# Patient Record
Sex: FEMALE | Race: BLACK OR AFRICAN AMERICAN | NOT HISPANIC OR LATINO | Employment: FULL TIME | URBAN - METROPOLITAN AREA
[De-identification: names, ages, dates, MRNs, and addresses within clinical notes are randomized per-mention and may not be internally consistent; named-entity substitution may affect disease eponyms.]

---

## 2023-04-16 DIAGNOSIS — I10 ESSENTIAL (PRIMARY) HYPERTENSION: ICD-10-CM

## 2023-04-17 RX ORDER — LISINOPRIL AND HYDROCHLOROTHIAZIDE 10; 12.5 MG/1; MG/1
TABLET ORAL
Qty: 90 TABLET | Refills: 0 | Status: SHIPPED | OUTPATIENT
Start: 2023-04-17 | End: 2024-01-25 | Stop reason: SDUPTHER

## 2023-04-24 ENCOUNTER — TELEPHONE (OUTPATIENT)
Dept: PRIMARY CARE | Facility: CLINIC | Age: 43
End: 2023-04-24

## 2023-09-06 PROBLEM — M54.9 BACK PAIN: Status: ACTIVE | Noted: 2023-09-06

## 2023-09-06 PROBLEM — K21.9 GERD (GASTROESOPHAGEAL REFLUX DISEASE): Status: ACTIVE | Noted: 2023-09-06

## 2023-09-06 PROBLEM — L73.9 FOLLICULITIS: Status: ACTIVE | Noted: 2023-09-06

## 2023-09-06 PROBLEM — R10.9 RIGHT SIDED ABDOMINAL PAIN: Status: ACTIVE | Noted: 2023-09-06

## 2023-09-06 PROBLEM — N89.8 VAGINAL LESION: Status: ACTIVE | Noted: 2023-09-06

## 2023-09-06 PROBLEM — M72.2 PLANTAR FASCIITIS, LEFT: Status: ACTIVE | Noted: 2023-09-06

## 2023-09-06 PROBLEM — R35.0 FREQUENCY OF URINATION: Status: ACTIVE | Noted: 2023-09-06

## 2023-09-06 PROBLEM — R05.9 COUGH: Status: ACTIVE | Noted: 2023-09-06

## 2023-09-06 PROBLEM — R31.9 HEMATURIA: Status: ACTIVE | Noted: 2023-09-06

## 2023-09-06 PROBLEM — S23.9XXA SPRAIN, THORACIC: Status: ACTIVE | Noted: 2023-09-06

## 2023-09-06 PROBLEM — R05.3 CHRONIC COUGH: Status: ACTIVE | Noted: 2023-09-06

## 2023-09-06 PROBLEM — E66.01 SEVERE OBESITY (BMI 35.0-39.9) WITH COMORBIDITY (MULTI): Status: ACTIVE | Noted: 2023-09-06

## 2023-09-06 PROBLEM — M25.561 RIGHT KNEE PAIN: Status: ACTIVE | Noted: 2023-09-06

## 2023-09-06 PROBLEM — N92.0 MENORRHAGIA: Status: ACTIVE | Noted: 2023-09-06

## 2023-09-06 PROBLEM — R07.81 PAINFUL RIB: Status: ACTIVE | Noted: 2023-09-06

## 2023-09-06 PROBLEM — L82.0 INFLAMED SEBORRHEIC KERATOSIS: Status: ACTIVE | Noted: 2022-10-20

## 2023-09-06 PROBLEM — R10.11 ABDOMINAL PAIN, RUQ (RIGHT UPPER QUADRANT): Status: ACTIVE | Noted: 2023-09-06

## 2023-09-06 PROBLEM — B35.1 TINEA UNGUIUM: Status: ACTIVE | Noted: 2022-10-20

## 2023-09-06 PROBLEM — N63.0 MASS OF BREAST: Status: ACTIVE | Noted: 2023-09-06

## 2023-09-06 PROBLEM — E55.9 VITAMIN D DEFICIENCY: Status: ACTIVE | Noted: 2023-09-06

## 2023-09-06 PROBLEM — R82.90 ABNORMAL URINE FINDING: Status: ACTIVE | Noted: 2023-09-06

## 2023-09-06 PROBLEM — R10.9 RIGHT FLANK PAIN: Status: ACTIVE | Noted: 2023-09-06

## 2023-09-06 PROBLEM — I10 HYPERTENSION: Status: ACTIVE | Noted: 2023-09-06

## 2023-09-06 PROBLEM — D22.70 MELANOCYTIC NEVI OF LOWER EXTREMITY OR HIP: Status: ACTIVE | Noted: 2022-10-20

## 2023-09-06 PROBLEM — D21.9 FIBROIDS: Status: ACTIVE | Noted: 2023-09-06

## 2023-09-06 PROBLEM — L82.1 DERMATOSIS PAPULOSA NIGRA: Status: ACTIVE | Noted: 2022-10-20

## 2023-09-06 PROBLEM — S29.019A STRAIN OF THORACIC REGION: Status: ACTIVE | Noted: 2023-09-06

## 2023-09-06 PROBLEM — M79.672 PAIN OF LEFT HEEL: Status: ACTIVE | Noted: 2023-09-06

## 2023-09-06 PROBLEM — N89.8 VAGINAL DISCHARGE: Status: ACTIVE | Noted: 2023-09-06

## 2023-09-06 PROBLEM — D64.9 ANEMIA: Status: ACTIVE | Noted: 2023-09-06

## 2023-09-06 PROBLEM — L98.9 SKIN LESION: Status: ACTIVE | Noted: 2023-09-06

## 2023-09-06 PROBLEM — L60.9 NAIL DISORDER, UNSPECIFIED: Status: ACTIVE | Noted: 2022-10-20

## 2023-09-06 PROBLEM — K76.89 LIVER CYST: Status: ACTIVE | Noted: 2023-09-06

## 2023-09-06 PROBLEM — R10.819 ABDOMINAL TENDERNESS: Status: ACTIVE | Noted: 2023-09-06

## 2023-09-06 PROBLEM — B35.6 TINEA CRURIS: Status: ACTIVE | Noted: 2023-09-06

## 2023-09-06 PROBLEM — L81.4 OTHER MELANIN HYPERPIGMENTATION: Status: ACTIVE | Noted: 2022-10-20

## 2023-09-06 PROBLEM — B35.1 TOENAIL FUNGUS: Status: ACTIVE | Noted: 2023-09-06

## 2023-09-06 PROBLEM — D48.5 NEOPLASM OF UNCERTAIN BEHAVIOR OF SKIN: Status: ACTIVE | Noted: 2022-10-20

## 2023-09-06 PROBLEM — L03.90 CELLULITIS: Status: ACTIVE | Noted: 2023-09-06

## 2023-09-06 PROBLEM — D75.839 THROMBOCYTOSIS: Status: ACTIVE | Noted: 2023-09-06

## 2023-09-06 PROBLEM — R94.5 ABNORMAL LIVER FUNCTION: Status: ACTIVE | Noted: 2023-09-06

## 2023-09-06 PROBLEM — N89.8 VAGINAL ODOR: Status: ACTIVE | Noted: 2023-09-06

## 2023-09-06 PROBLEM — K76.0 STEATOSIS OF LIVER: Status: ACTIVE | Noted: 2023-09-06

## 2023-09-06 PROBLEM — N64.4 BREAST TENDERNESS IN FEMALE: Status: ACTIVE | Noted: 2023-09-06

## 2023-09-06 RX ORDER — FLUTICASONE PROPIONATE 50 MCG
2 SPRAY, SUSPENSION (ML) NASAL DAILY
COMMUNITY
Start: 2022-01-24

## 2023-09-06 RX ORDER — TACROLIMUS 1 MG/G
1 OINTMENT TOPICAL
COMMUNITY
Start: 2018-11-26

## 2023-09-06 RX ORDER — KETOCONAZOLE 20 MG/G
1 CREAM TOPICAL
COMMUNITY
Start: 2018-11-26

## 2023-09-06 RX ORDER — LIDOCAINE 50 MG/G
PATCH TOPICAL
COMMUNITY
Start: 2021-03-03

## 2023-09-06 RX ORDER — DROSPIRENONE 4 MG/1
1 TABLET, FILM COATED ORAL DAILY
COMMUNITY
Start: 2020-03-31

## 2023-09-06 RX ORDER — ERGOCALCIFEROL 1.25 MG/1
1 CAPSULE ORAL
COMMUNITY
Start: 2022-03-04

## 2023-09-06 RX ORDER — BENZONATATE 200 MG/1
200 CAPSULE ORAL 3 TIMES DAILY PRN
COMMUNITY
Start: 2020-02-24

## 2023-09-06 RX ORDER — IBUPROFEN 600 MG/1
600 TABLET ORAL EVERY 6 HOURS PRN
COMMUNITY
Start: 2015-01-29

## 2023-09-06 RX ORDER — FERROUS SULFATE 220 (44)/5
15 SOLUTION, ORAL ORAL EVERY OTHER DAY
COMMUNITY
Start: 2021-05-06 | End: 2024-01-25 | Stop reason: SDUPTHER

## 2023-09-06 RX ORDER — EFINACONAZOLE 100 MG/ML
1 SOLUTION TOPICAL
COMMUNITY
Start: 2018-11-26

## 2023-10-05 ENCOUNTER — OFFICE VISIT (OUTPATIENT)
Dept: DERMATOLOGY | Facility: CLINIC | Age: 43
End: 2023-10-05
Payer: COMMERCIAL

## 2023-10-05 DIAGNOSIS — D23.4 BENIGN NEOPLASM OF SKIN OF NECK: Primary | ICD-10-CM

## 2023-10-05 DIAGNOSIS — L81.9 HYPERPIGMENTATION OF SKIN: ICD-10-CM

## 2023-10-05 PROCEDURE — 99213 OFFICE O/P EST LOW 20 MIN: CPT | Performed by: STUDENT IN AN ORGANIZED HEALTH CARE EDUCATION/TRAINING PROGRAM

## 2023-10-05 RX ORDER — HYDROQUINONE 40 MG/G
CREAM TOPICAL
Qty: 414 G | Refills: 3 | Status: SHIPPED | OUTPATIENT
Start: 2023-10-05 | End: 2024-10-04

## 2023-10-05 NOTE — PROGRESS NOTES
Subjective     Catherine Jones is a 43 y.o. female who presents for the following: Pigment Change (Hyperpigmentation.).     Review of Systems:  No other skin or systemic complaints other than what is documented elsewhere in the note.    The following portions of the chart were reviewed this encounter and updated as appropriate:          Skin Cancer History  No skin cancer on file.      Specialty Problems          Dermatology Problems    Dermatosis papulosa nigra    Inflamed seborrheic keratosis    Melanocytic nevi of lower extremity or hip    Nail disorder, unspecified    Neoplasm of uncertain behavior of skin    Other melanin hyperpigmentation    Tinea unguium    Folliculitis    Skin lesion    Tinea cruris    Toenail fungus        Objective   Well appearing patient in no apparent distress; mood and affect are within normal limits.    A focused skin examination was performed. All findings within normal limits unless otherwise noted below.    Assessment/Plan   1. Benign neoplasm of skin of neck    Related Procedures  Follow Up In Dermatology - Established Patient    2. Hyperpigmentation of skin  Left Malar Cheek  Hyperpigmented patches on face    Focal hyperpigmentation   Continue daily sun screen  Start 4% hydroquinone  Re-eval in 1 year  Only use every 3-4 days given long term side effects

## 2024-01-04 ENCOUNTER — HOSPITAL ENCOUNTER (EMERGENCY)
Facility: HOSPITAL | Age: 44
Discharge: HOME | End: 2024-01-04
Payer: COMMERCIAL

## 2024-01-04 ENCOUNTER — APPOINTMENT (OUTPATIENT)
Dept: RADIOLOGY | Facility: HOSPITAL | Age: 44
End: 2024-01-04
Payer: COMMERCIAL

## 2024-01-04 VITALS
SYSTOLIC BLOOD PRESSURE: 127 MMHG | HEART RATE: 56 BPM | TEMPERATURE: 98.1 F | BODY MASS INDEX: 39.15 KG/M2 | DIASTOLIC BLOOD PRESSURE: 64 MMHG | HEIGHT: 65 IN | RESPIRATION RATE: 16 BRPM | WEIGHT: 235 LBS | OXYGEN SATURATION: 99 %

## 2024-01-04 DIAGNOSIS — R10.9 FLANK PAIN: Primary | ICD-10-CM

## 2024-01-04 LAB
ALBUMIN SERPL BCP-MCNC: 4 G/DL (ref 3.4–5)
ALP SERPL-CCNC: 57 U/L (ref 33–110)
ALT SERPL W P-5'-P-CCNC: 11 U/L (ref 7–45)
ANION GAP SERPL CALC-SCNC: 11 MMOL/L (ref 10–20)
APPEARANCE UR: ABNORMAL
AST SERPL W P-5'-P-CCNC: 29 U/L (ref 9–39)
B-HCG SERPL-ACNC: <2 MIU/ML
BASOPHILS # BLD AUTO: 0.06 X10*3/UL (ref 0–0.1)
BASOPHILS NFR BLD AUTO: 0.8 %
BILIRUB SERPL-MCNC: 0.7 MG/DL (ref 0–1.2)
BILIRUB UR STRIP.AUTO-MCNC: NEGATIVE MG/DL
BUN SERPL-MCNC: 8 MG/DL (ref 6–23)
CALCIUM SERPL-MCNC: 9.2 MG/DL (ref 8.6–10.3)
CHLORIDE SERPL-SCNC: 103 MMOL/L (ref 98–107)
CO2 SERPL-SCNC: 27 MMOL/L (ref 21–32)
COLOR UR: YELLOW
CREAT SERPL-MCNC: 0.94 MG/DL (ref 0.5–1.05)
EOSINOPHIL # BLD AUTO: 0.45 X10*3/UL (ref 0–0.7)
EOSINOPHIL NFR BLD AUTO: 5.9 %
ERYTHROCYTE [DISTWIDTH] IN BLOOD BY AUTOMATED COUNT: 18.5 % (ref 11.5–14.5)
GFR SERPL CREATININE-BSD FRML MDRD: 76 ML/MIN/1.73M*2
GLUCOSE SERPL-MCNC: 94 MG/DL (ref 74–99)
GLUCOSE UR STRIP.AUTO-MCNC: NEGATIVE MG/DL
HCT VFR BLD AUTO: 34.8 % (ref 36–46)
HGB BLD-MCNC: 11.3 G/DL (ref 12–16)
IMM GRANULOCYTES # BLD AUTO: 0.02 X10*3/UL (ref 0–0.7)
IMM GRANULOCYTES NFR BLD AUTO: 0.3 % (ref 0–0.9)
KETONES UR STRIP.AUTO-MCNC: NEGATIVE MG/DL
LEUKOCYTE ESTERASE UR QL STRIP.AUTO: NEGATIVE
LIPASE SERPL-CCNC: 60 U/L (ref 9–82)
LYMPHOCYTES # BLD AUTO: 2.7 X10*3/UL (ref 1.2–4.8)
LYMPHOCYTES NFR BLD AUTO: 35.5 %
MCH RBC QN AUTO: 23.2 PG (ref 26–34)
MCHC RBC AUTO-ENTMCNC: 32.5 G/DL (ref 32–36)
MCV RBC AUTO: 71 FL (ref 80–100)
MONOCYTES # BLD AUTO: 0.43 X10*3/UL (ref 0.1–1)
MONOCYTES NFR BLD AUTO: 5.7 %
NEUTROPHILS # BLD AUTO: 3.94 X10*3/UL (ref 1.2–7.7)
NEUTROPHILS NFR BLD AUTO: 51.8 %
NITRITE UR QL STRIP.AUTO: NEGATIVE
NRBC BLD-RTO: 0 /100 WBCS (ref 0–0)
PH UR STRIP.AUTO: 5 [PH]
PLATELET # BLD AUTO: 470 X10*3/UL (ref 150–450)
POTASSIUM SERPL-SCNC: 3.9 MMOL/L (ref 3.5–5.3)
PROT SERPL-MCNC: 7.5 G/DL (ref 6.4–8.2)
PROT UR STRIP.AUTO-MCNC: NEGATIVE MG/DL
RBC # BLD AUTO: 4.88 X10*6/UL (ref 4–5.2)
RBC # UR STRIP.AUTO: NEGATIVE /UL
SODIUM SERPL-SCNC: 137 MMOL/L (ref 136–145)
SP GR UR STRIP.AUTO: 1.01
UROBILINOGEN UR STRIP.AUTO-MCNC: <2 MG/DL
WBC # BLD AUTO: 7.6 X10*3/UL (ref 4.4–11.3)

## 2024-01-04 PROCEDURE — 74177 CT ABD & PELVIS W/CONTRAST: CPT | Mod: FOREIGN READ | Performed by: RADIOLOGY

## 2024-01-04 PROCEDURE — 2550000001 HC RX 255 CONTRASTS

## 2024-01-04 PROCEDURE — 84702 CHORIONIC GONADOTROPIN TEST: CPT

## 2024-01-04 PROCEDURE — 83690 ASSAY OF LIPASE: CPT | Performed by: EMERGENCY MEDICINE

## 2024-01-04 PROCEDURE — 99284 EMERGENCY DEPT VISIT MOD MDM: CPT

## 2024-01-04 PROCEDURE — 74177 CT ABD & PELVIS W/CONTRAST: CPT | Mod: FR

## 2024-01-04 PROCEDURE — 81003 URINALYSIS AUTO W/O SCOPE: CPT | Performed by: EMERGENCY MEDICINE

## 2024-01-04 PROCEDURE — 84075 ASSAY ALKALINE PHOSPHATASE: CPT | Performed by: EMERGENCY MEDICINE

## 2024-01-04 PROCEDURE — 85025 COMPLETE CBC W/AUTO DIFF WBC: CPT

## 2024-01-04 PROCEDURE — 36415 COLL VENOUS BLD VENIPUNCTURE: CPT

## 2024-01-04 RX ORDER — KETOROLAC TROMETHAMINE 30 MG/ML
15 INJECTION, SOLUTION INTRAMUSCULAR; INTRAVENOUS ONCE
Status: DISCONTINUED | OUTPATIENT
Start: 2024-01-04 | End: 2024-01-04 | Stop reason: HOSPADM

## 2024-01-04 RX ORDER — KETOROLAC TROMETHAMINE 30 MG/ML
15 INJECTION, SOLUTION INTRAMUSCULAR; INTRAVENOUS ONCE
Status: DISCONTINUED | OUTPATIENT
Start: 2024-01-04 | End: 2024-01-04

## 2024-01-04 RX ORDER — CYCLOBENZAPRINE HCL 10 MG
10 TABLET ORAL
Qty: 5 TABLET | Refills: 0 | Status: SHIPPED | OUTPATIENT
Start: 2024-01-04 | End: 2024-01-09

## 2024-01-04 RX ORDER — NAPROXEN 500 MG/1
500 TABLET ORAL
Qty: 14 TABLET | Refills: 0 | Status: SHIPPED | OUTPATIENT
Start: 2024-01-04 | End: 2024-01-11

## 2024-01-04 RX ADMIN — IOHEXOL 75 ML: 350 INJECTION, SOLUTION INTRAVENOUS at 16:05

## 2024-01-04 ASSESSMENT — COLUMBIA-SUICIDE SEVERITY RATING SCALE - C-SSRS
2. HAVE YOU ACTUALLY HAD ANY THOUGHTS OF KILLING YOURSELF?: NO
6. HAVE YOU EVER DONE ANYTHING, STARTED TO DO ANYTHING, OR PREPARED TO DO ANYTHING TO END YOUR LIFE?: NO
1. IN THE PAST MONTH, HAVE YOU WISHED YOU WERE DEAD OR WISHED YOU COULD GO TO SLEEP AND NOT WAKE UP?: NO

## 2024-01-04 ASSESSMENT — PAIN SCALES - GENERAL
PAINLEVEL_OUTOF10: 4
PAINLEVEL_OUTOF10: 9

## 2024-01-04 ASSESSMENT — PAIN - FUNCTIONAL ASSESSMENT: PAIN_FUNCTIONAL_ASSESSMENT: 0-10

## 2024-01-04 NOTE — ED PROVIDER NOTES
HPI   Chief Complaint   Patient presents with    Flank Pain    Abdominal Pain       HPI  HISTORY OF PRESENT ILLNESS:  43 y.o. female presenting to the ED with complaint of right upper quadrant and right flank pain ongoing for the past 3 days.  She states that it was relatively sudden onset.  Is aching in quality and has been constant.  She denies any specific alleviating or aggravating factors.  She does endorse nausea, no vomiting.  No constipation or diarrhea.  No urinary symptoms.  No chest pain or shortness of breath.  Pain is not pleuritic.  No cough or hemoptysis.  No lower extremity pain or swelling.  History of a cholecystectomy, she states this feels like when she is to have gallstones.  Denies fevers or chills.  Normal appetite.  No dizziness lightheadedness, no syncope.  No neck pain or stiffness no other complaints or symptoms voiced.    PMH: GERD, HTN, liver cysts, cholecystectomy  Family history: noncontributory  Social history: non smoker, no ETOH, no illicit substances    12 point review of systems was performed and is negative unless otherwise specified in HPI.        No data recorded                Patient History   Past Medical History:   Diagnosis Date    Other specified diseases of liver 2020    Liver cyst    Personal history of other diseases of the digestive system 2015    History of constipation    Personal history of other diseases of the digestive system 10/07/2015    History of constipation    Personal history of other diseases of urinary system 2017    History of hematuria     Past Surgical History:   Procedure Laterality Date     SECTION, CLASSIC  2015     Section    CHOLECYSTECTOMY  2015    Cholecystectomy Laparoscopic     Family History   Problem Relation Name Age of Onset    Asthma Sibling      Other (hay fever/sesonal allergies) Child      Eczema Parent       Social History     Tobacco Use    Smoking status: Not on file    Smokeless  tobacco: Not on file   Substance Use Topics    Alcohol use: Not on file    Drug use: Not on file       Physical Exam   ED Triage Vitals   Temp Heart Rate Resp BP   01/04/24 0957 01/04/24 0957 01/04/24 0957 01/04/24 0957   36.7 °C (98 °F) 55 17 160/61      SpO2 Temp Source Heart Rate Source Patient Position   01/04/24 0957 01/04/24 1238 -- --   98 % Oral        BP Location FiO2 (%)     -- --             Physical Exam  Constitutional:       General: She is not in acute distress.     Appearance: She is not toxic-appearing.   HENT:      Head: Normocephalic and atraumatic.      Nose: No congestion.      Mouth/Throat:      Mouth: Mucous membranes are moist.   Eyes:      General: No scleral icterus.     Extraocular Movements: Extraocular movements intact.      Pupils: Pupils are equal, round, and reactive to light.   Cardiovascular:      Rate and Rhythm: Normal rate and regular rhythm.      Pulses: Normal pulses.   Pulmonary:      Effort: Pulmonary effort is normal. No respiratory distress.      Breath sounds: Normal breath sounds.   Abdominal:      General: There is no distension.      Palpations: Abdomen is soft. There is no hepatomegaly, splenomegaly, mass or pulsatile mass.      Tenderness: There is abdominal tenderness in the right upper quadrant. There is no right CVA tenderness, left CVA tenderness or guarding. Negative signs include Tsewart's sign and McBurney's sign.      Hernia: No hernia is present.      Comments: Tenderness palpation of the right upper quadrant and into the right flank.  No guarding or rigidity, no distention, no peritoneal signs.  No CVA tenderness.   Musculoskeletal:         General: Normal range of motion.      Cervical back: Normal range of motion and neck supple. No rigidity.      Right lower leg: No edema.      Left lower leg: No edema.   Skin:     General: Skin is warm and dry.      Capillary Refill: Capillary refill takes less than 2 seconds.   Neurological:      General: No focal  deficit present.      Mental Status: She is alert and oriented to person, place, and time.      Gait: Gait normal.   Psychiatric:         Mood and Affect: Mood normal.         Behavior: Behavior normal.         Judgment: Judgment normal.     ED Course & MDM   Diagnoses as of 01/04/24 1732   Flank pain       Medical Decision Making  ED course / MDM     Summary:  Patient presented with right upper quadrant and right flank pain for the past 3 days.  Some nausea.  No other associated symptoms.  Vital signs stable, patient is overall very well-appearing.  Ambulating unassisted.  No acute distress.  On exam, there is some tenderness in the right upper quadrant and into the right flank, no CVA tenderness, no distention, no guarding or rigidity.  Lungs clear to auscultation.  Heart regular rate and rhythm.  She has no pleuritic pain, no cough or hemoptysis, no shortness of breath or chest pain, no tachycardia or hypoxia, doubt PE.  Symptoms appear below the diaphragm.  Labs and CT scan ordered, as well as a dose of Toradol.  Labs are reassuring, no leukocytosis, hemoglobin 11.3, no electrolyte abnormalities, normal kidney and liver function.  Normal lipase.  Negative hCG.  Urinalysis is noninfected.  No blood.  CT scan is unremarkable, no acute process, no biliary dilation, no pyonephritis, no ureteral stone, stable liver cyst.  Results and differential were discussed in detail with the patient.  Workup including labs and CT scan are reassuring, no indication of any acute intra-abdominal abnormality.  Suspect symptoms related to musculoskeletal pain or radiculopathy.  Pain significantly improved with Toradol.  Patient is eager to be discharged.  Prescriptions written for lidocaine patches, Flexeril, and naproxen.  Will follow-up with her PCP and gastroenterology. Patient was given strict return precautions, understands reasons to return to the ED. Also discussed supportive care instructions. I expressed the importance of  outpatient follow up with their PCP. All questions were answered, patient expressed understanding and stated that they would comply.    Patient was advised to follow up with PCP or recommended provider in 2-3 days for another evaluation and exam. I advised patient and family/friend/caregiver/guardian to return or go to closest emergency room immediately if symptoms change, get worse, new symptoms develop prior to follow up. If there is no improvement in symptoms in the next 24 hours they are advised to return for further evaluation and exam. I also explained the plan and treatment course. Patient and family/friend/caregiver/guardian is in agreement with plan, treatment course, and follow up and states verbally that they will comply.    Impression:  1. See diagnosis    Plan: Homegoing. I discussed the differential, results, and discharge plan with the patient and family/friend/caregiver. I emphasized the importance of follow-up with the physician I referred them to in the timeframe recommended.  I explained reasons for the patient to return to the Emergency Department. They agreed that if they feel their condition is worsening or if they have any other concern they should call 911 immediately for further assistance. We also discussed medications that were prescribed including common side effects and interactions. The patient was advised to abstain from driving, operating heavy machinery, or making significant decisions while taking medications such as opiates and muscle relaxers that may impair this. I gave the patient an opportunity to ask all questions they had and answered all of them accordingly. They understand return precautions and discharge instructions. The patient and family/friend/caregiver expressed understanding verbally and that they would comply.       Disposition: Discharge    This note has been transcribed using voice recognition and may contain grammatical errors, misplaced words, incorrect words,  incorrect phrases or other errors.   Procedure  Procedures     Elisa Bae PA-C  01/04/24 7867

## 2024-01-04 NOTE — ED TRIAGE NOTES
PT TO ED FROM HOME WITH CONCERNS FOR RIGHT FLANK AND BACK PAIN FOR 5 DAYS WITH NORMAL URINARY PATTERN.

## 2024-01-04 NOTE — Clinical Note
Catherine Jones was seen and treated in our emergency department on 1/4/2024.  She may return to work on 01/08/2024.       If you have any questions or concerns, please don't hesitate to call.      Elisa Bae PA-C

## 2024-01-23 ENCOUNTER — TELEPHONE (OUTPATIENT)
Dept: PRIMARY CARE | Facility: CLINIC | Age: 44
End: 2024-01-23

## 2024-01-23 NOTE — TELEPHONE ENCOUNTER
Patient needs a Rx refill on lisinopril 10-12.5 mg and ferrous sulfate 8.8 mg/mL elemental iron elixir the patient was trying to get a alejo sooner since they have been in ER but hteir was only March visit for her what do you suggest           Pharmacy    [id number] - [facility], [place] - [address]  [address] [facility] [address]  Phone: [phone]  Fax: [phone]

## 2024-01-25 DIAGNOSIS — I10 ESSENTIAL (PRIMARY) HYPERTENSION: ICD-10-CM

## 2024-01-25 DIAGNOSIS — D64.9 ANEMIA, UNSPECIFIED TYPE: Primary | ICD-10-CM

## 2024-01-25 RX ORDER — FERROUS SULFATE 220 (44)/5
SOLUTION, ORAL ORAL
Qty: 825 ML | Refills: 1 | Status: SHIPPED | OUTPATIENT
Start: 2024-01-25

## 2024-01-25 RX ORDER — LISINOPRIL AND HYDROCHLOROTHIAZIDE 10; 12.5 MG/1; MG/1
1 TABLET ORAL DAILY
Qty: 90 TABLET | Refills: 0 | Status: SHIPPED | OUTPATIENT
Start: 2024-01-25 | End: 2024-04-23

## 2024-04-05 ENCOUNTER — TELEMEDICINE (OUTPATIENT)
Dept: PRIMARY CARE | Facility: CLINIC | Age: 44
End: 2024-04-05
Payer: COMMERCIAL

## 2024-04-05 DIAGNOSIS — I10 HYPERTENSION, UNSPECIFIED TYPE: ICD-10-CM

## 2024-04-05 DIAGNOSIS — G89.29 CHRONIC BILATERAL LOW BACK PAIN WITHOUT SCIATICA: Primary | ICD-10-CM

## 2024-04-05 DIAGNOSIS — B37.2 CANDIDAL INTERTRIGO: ICD-10-CM

## 2024-04-05 DIAGNOSIS — E66.01 SEVERE OBESITY (BMI 35.0-39.9) WITH COMORBIDITY (MULTI): ICD-10-CM

## 2024-04-05 DIAGNOSIS — D50.0 IRON DEFICIENCY ANEMIA DUE TO CHRONIC BLOOD LOSS: ICD-10-CM

## 2024-04-05 DIAGNOSIS — M54.50 CHRONIC BILATERAL LOW BACK PAIN WITHOUT SCIATICA: Primary | ICD-10-CM

## 2024-04-05 DIAGNOSIS — D21.9 FIBROIDS: ICD-10-CM

## 2024-04-05 PROCEDURE — 99443 PR PHYS/QHP TELEPHONE EVALUATION 21-30 MIN: CPT | Performed by: NURSE PRACTITIONER

## 2024-04-05 RX ORDER — NYSTATIN 100000 [USP'U]/G
1 POWDER TOPICAL
COMMUNITY
End: 2024-04-05 | Stop reason: SDUPTHER

## 2024-04-05 RX ORDER — NYSTATIN 100000 [USP'U]/G
1 POWDER TOPICAL 2 TIMES DAILY
Qty: 60 G | Refills: 1 | Status: SHIPPED | OUTPATIENT
Start: 2024-04-05

## 2024-04-05 RX ORDER — BUPROPION HYDROCHLORIDE 150 MG/1
TABLET ORAL
Qty: 90 TABLET | Refills: 0 | Status: SHIPPED | OUTPATIENT
Start: 2024-04-05

## 2024-04-05 NOTE — PROGRESS NOTES
Subjective   Patient ID: Catherine Jones is a 43 y.o. female who presents for No chief complaint on file..    HPI  the patient has had back pain with her menses   She had an issue with iron deficiency   She feels not well today   She is bleeding heavily during her menses   She needs lidocaine patches for her pain   She took naprosyn   Her back pain is worse at night , she does not have pain radiating down the legs  She sleeps on her side   She just got off adipex for weight loss  She is sleeping a lot   She goes to the gym 5 times a week   She wakes up out of her sleep choking, she has restless legs   Her face is breaking out   Her menses are much heavier now   When she lies down she has more back pain   She only got her back pain when  got a gallbladder surgery   She was 242 and is down to 232 and losing weight using phenterime she would like to be referred to a weight clinic   She got it from a doctor in [place]   She is wanting to get a pannus reduction   She has a smell under the stomach folds   She does scizzors on the floor and abdominal exercises   She does not lift a lot of weights     Review of Systems    Objective   There were no vitals taken for this visit.    Physical Exam  Constitutional:       Appearance: Normal appearance. She is obese.   HENT:      Head: Normocephalic.      Nose: Nose normal.   Pulmonary:      Effort: Pulmonary effort is normal.   Neurological:      Mental Status: She is alert. Mental status is at baseline.   Psychiatric:         Mood and Affect: Mood normal.         Behavior: Behavior normal.         Thought Content: Thought content normal.         Judgment: Judgment normal.     The ultrasound of the pelvis showed multiple fibroids that were retroverted and retroflexed . The largest one is 2 x 2.1x 1.6 cm   This was done 3/19/2020     Assessment/Plan   Problem List Items Addressed This Visit             ICD-10-CM    Anemia D64.9    Relevant Orders    Iron and TIBC    Ferritin     Referral to Nutrition Services    Back pain - Primary M54.9    Fibroids D21.9    Relevant Orders    Referral to Obstetrics / Gynecology    Hypertension I10    Relevant Orders    TSH with reflex to Free T4 if abnormal    Comprehensive Metabolic Panel    Uric Acid    Urinalysis with Reflex Microscopic    CBC    Severe obesity (BMI 35.0-39.9) with comorbidity (Multi) E66.01    Relevant Medications    buPROPion XL (Wellbutrin XL) 150 mg 24 hr tablet    Other Relevant Orders    Referral to [facility]    Referral to Nutrition Services     Other Visit Diagnoses         Codes    Candidal intertrigo     B37.2    Relevant Medications    nystatin (Mycostatin) 100,000 unit/gram powder    buPROPion XL (Wellbutrin XL) 150 mg 24 hr tablet

## 2024-04-22 ENCOUNTER — LAB (OUTPATIENT)
Dept: LAB | Facility: LAB | Age: 44
End: 2024-04-22
Payer: COMMERCIAL

## 2024-04-22 DIAGNOSIS — I10 HYPERTENSION, UNSPECIFIED TYPE: ICD-10-CM

## 2024-04-22 DIAGNOSIS — D50.0 IRON DEFICIENCY ANEMIA DUE TO CHRONIC BLOOD LOSS: ICD-10-CM

## 2024-04-22 LAB
ALBUMIN SERPL BCP-MCNC: 4 G/DL (ref 3.4–5)
ALP SERPL-CCNC: 66 U/L (ref 33–110)
ALT SERPL W P-5'-P-CCNC: 11 U/L (ref 7–45)
ANION GAP SERPL CALC-SCNC: 12 MMOL/L (ref 10–20)
APPEARANCE UR: CLEAR
AST SERPL W P-5'-P-CCNC: 29 U/L (ref 9–39)
BILIRUB SERPL-MCNC: 0.5 MG/DL (ref 0–1.2)
BILIRUB UR STRIP.AUTO-MCNC: NEGATIVE MG/DL
BUN SERPL-MCNC: 11 MG/DL (ref 6–23)
CALCIUM SERPL-MCNC: 9.5 MG/DL (ref 8.6–10.6)
CHLORIDE SERPL-SCNC: 104 MMOL/L (ref 98–107)
CO2 SERPL-SCNC: 27 MMOL/L (ref 21–32)
COLOR UR: NORMAL
CREAT SERPL-MCNC: 0.99 MG/DL (ref 0.5–1.05)
EGFRCR SERPLBLD CKD-EPI 2021: 72 ML/MIN/1.73M*2
ERYTHROCYTE [DISTWIDTH] IN BLOOD BY AUTOMATED COUNT: 19 % (ref 11.5–14.5)
FERRITIN SERPL-MCNC: 19 NG/ML (ref 8–150)
GLUCOSE SERPL-MCNC: 91 MG/DL (ref 74–99)
GLUCOSE UR STRIP.AUTO-MCNC: NORMAL MG/DL
HCT VFR BLD AUTO: 34 % (ref 36–46)
HGB BLD-MCNC: 10.8 G/DL (ref 12–16)
IRON SATN MFR SERPL: 6 % (ref 25–45)
IRON SERPL-MCNC: 25 UG/DL (ref 35–150)
KETONES UR STRIP.AUTO-MCNC: NEGATIVE MG/DL
LEUKOCYTE ESTERASE UR QL STRIP.AUTO: NEGATIVE
MCH RBC QN AUTO: 23.3 PG (ref 26–34)
MCHC RBC AUTO-ENTMCNC: 31.8 G/DL (ref 32–36)
MCV RBC AUTO: 73 FL (ref 80–100)
NITRITE UR QL STRIP.AUTO: NEGATIVE
NRBC BLD-RTO: 0 /100 WBCS (ref 0–0)
PH UR STRIP.AUTO: 6 [PH]
PLATELET # BLD AUTO: 501 X10*3/UL (ref 150–450)
POTASSIUM SERPL-SCNC: 3.9 MMOL/L (ref 3.5–5.3)
PROT SERPL-MCNC: 7.3 G/DL (ref 6.4–8.2)
PROT UR STRIP.AUTO-MCNC: NEGATIVE MG/DL
RBC # BLD AUTO: 4.63 X10*6/UL (ref 4–5.2)
RBC # UR STRIP.AUTO: NEGATIVE /UL
SODIUM SERPL-SCNC: 139 MMOL/L (ref 136–145)
SP GR UR STRIP.AUTO: 1.02
TIBC SERPL-MCNC: 445 UG/DL (ref 240–445)
TSH SERPL-ACNC: 1.88 MIU/L (ref 0.44–3.98)
UIBC SERPL-MCNC: 420 UG/DL (ref 110–370)
URATE SERPL-MCNC: 5.9 MG/DL (ref 2.3–6.7)
UROBILINOGEN UR STRIP.AUTO-MCNC: NORMAL MG/DL
WBC # BLD AUTO: 7.7 X10*3/UL (ref 4.4–11.3)

## 2024-04-22 PROCEDURE — 83540 ASSAY OF IRON: CPT

## 2024-04-22 PROCEDURE — 84443 ASSAY THYROID STIM HORMONE: CPT

## 2024-04-22 PROCEDURE — 85027 COMPLETE CBC AUTOMATED: CPT

## 2024-04-22 PROCEDURE — 81003 URINALYSIS AUTO W/O SCOPE: CPT

## 2024-04-22 PROCEDURE — 80053 COMPREHEN METABOLIC PANEL: CPT

## 2024-04-22 PROCEDURE — 84550 ASSAY OF BLOOD/URIC ACID: CPT

## 2024-04-22 PROCEDURE — 83550 IRON BINDING TEST: CPT

## 2024-04-22 PROCEDURE — 36415 COLL VENOUS BLD VENIPUNCTURE: CPT

## 2024-04-22 PROCEDURE — 82728 ASSAY OF FERRITIN: CPT

## 2024-04-23 DIAGNOSIS — I10 ESSENTIAL (PRIMARY) HYPERTENSION: ICD-10-CM

## 2024-04-23 RX ORDER — LISINOPRIL AND HYDROCHLOROTHIAZIDE 10; 12.5 MG/1; MG/1
1 TABLET ORAL DAILY
Qty: 90 TABLET | Refills: 1 | Status: SHIPPED | OUTPATIENT
Start: 2024-04-23

## 2024-05-14 DIAGNOSIS — E66.01 SEVERE OBESITY (BMI 35.0-39.9) WITH COMORBIDITY (MULTI): Primary | ICD-10-CM

## 2024-08-29 ENCOUNTER — HOSPITAL ENCOUNTER (OUTPATIENT)
Dept: RADIOLOGY | Facility: EXTERNAL LOCATION | Age: 44
Discharge: HOME | End: 2024-08-29

## 2024-08-29 DIAGNOSIS — M25.552 BILATERAL HIP PAIN: ICD-10-CM

## 2024-08-29 DIAGNOSIS — M25.551 BILATERAL HIP PAIN: ICD-10-CM

## 2024-09-23 ENCOUNTER — OFFICE VISIT (OUTPATIENT)
Dept: ORTHOPEDIC SURGERY | Facility: HOSPITAL | Age: 44
End: 2024-09-23
Payer: COMMERCIAL

## 2024-09-23 VITALS — HEIGHT: 66 IN | WEIGHT: 240 LBS | BODY MASS INDEX: 38.57 KG/M2

## 2024-09-23 DIAGNOSIS — M84.353A FEMORAL NECK STRESS FRACTURE, INITIAL ENCOUNTER: Primary | ICD-10-CM

## 2024-09-23 DIAGNOSIS — M53.3 SI (SACROILIAC) JOINT DYSFUNCTION: ICD-10-CM

## 2024-09-23 DIAGNOSIS — M25.859 FEMORAL ACETABULAR IMPINGEMENT: ICD-10-CM

## 2024-09-23 PROCEDURE — 1036F TOBACCO NON-USER: CPT | Performed by: PHYSICIAN ASSISTANT

## 2024-09-23 PROCEDURE — E0114 CRUTCH UNDERARM PAIR NO WOOD: HCPCS | Performed by: PHYSICIAN ASSISTANT

## 2024-09-23 PROCEDURE — 3008F BODY MASS INDEX DOCD: CPT | Performed by: PHYSICIAN ASSISTANT

## 2024-09-23 PROCEDURE — 99204 OFFICE O/P NEW MOD 45 MIN: CPT | Performed by: PHYSICIAN ASSISTANT

## 2024-09-23 PROCEDURE — 99214 OFFICE O/P EST MOD 30 MIN: CPT | Performed by: PHYSICIAN ASSISTANT

## 2024-09-23 RX ORDER — MELOXICAM 15 MG/1
15 TABLET ORAL DAILY
Qty: 30 TABLET | Refills: 1 | Status: SHIPPED | OUTPATIENT
Start: 2024-09-23 | End: 2024-11-22

## 2024-09-23 ASSESSMENT — PAIN SCALES - GENERAL: PAINLEVEL_OUTOF10: 9

## 2024-09-23 ASSESSMENT — PAIN - FUNCTIONAL ASSESSMENT: PAIN_FUNCTIONAL_ASSESSMENT: 0-10

## 2024-09-23 NOTE — PROGRESS NOTES
HPI    This is a 44 y.o. female today complaining of bilateral hip pain.  L>R. Pain has been present for 3+ months.  It is located anterior and lateral and described as pinching.    They do not complain of catching/clicking.  Pain is worse with standing, walking, prolonged sitting, and increased activity.   They have utilized anti-inflammatories, rest, ice, and therapeutic exercises for 3 month(s) but their pain persists.     Past Medical History:   Diagnosis Date    Other specified diseases of liver 2020    Liver cyst    Personal history of other diseases of the digestive system 2015    History of constipation    Personal history of other diseases of the digestive system 10/07/2015    History of constipation    Personal history of other diseases of urinary system 2017    History of hematuria       Past Surgical History:   Procedure Laterality Date     SECTION, CLASSIC  2015     Section    CHOLECYSTECTOMY  2015    Cholecystectomy Laparoscopic       Social History     Socioeconomic History    Marital status: Single     Spouse name: Not on file    Number of children: Not on file    Years of education: Not on file    Highest education level: Not on file   Occupational History    Not on file   Tobacco Use    Smoking status: Never    Smokeless tobacco: Never   Vaping Use    Vaping status: Never Used   Substance and Sexual Activity    Alcohol use: Never    Drug use: Never    Sexual activity: Not on file   Other Topics Concern    Not on file   Social History Narrative    Not on file     Social Determinants of Health     Financial Resource Strain: Not on file   Food Insecurity: Not on file   Transportation Needs: Not on file   Physical Activity: Not on file   Stress: Not on file   Social Connections: Not on file   Intimate Partner Violence: Not on file   Housing Stability: Not on file         ROS  Review of systems reviewed and pertinent positives mentioned in HPI.      PHYSICAL  EXAM  There is not  pain with a resisted situp.    There is not abdominal distention or tenderness    The patient's range of motion reveals that they have 90° of hip flexion on the affected side.   Hip extension to 10°   Abduction to 45°      The patient is 5 out of 5 strength with resisted hip AB, adduction, hamstring and quadriceps testing.    No pain over the hip flexor, ASIS.  No pain over the proximal hamstring, piriformis      Pain Provacation testing:    Positive impingement sign,with a painful arc from 12 to 3:00.  Positive FADIR  Negative Psoas impingement/Jose Miguel test  Negative instability, Log roll.  Positive subspine impingement test, which is pain with straight hip flexion.    Negative straight leg raise.  Negative circumduction clunk.      Peritrochanteric space examination:  Tenderness over the dionisio-trochanteric space - Yes  pain over the posterior trochanter - Yes      IMAGING  X-rays reviewed reveal  I personally reviewed the patient's x-ray images and reports of the b/l  hip. The xrays show cortical thickening of the femoral necks.  Normal joint spacing            ASSESSMENT  B/l hip femoral stress fracture, IZABELA      PLAN  This is a 44 y.o. female patient here today with significant hip pain.        PLAN: I discussed with the patient the differential diagnosis, complex overuse and degenerative related nature of the condition(s) and available treatment option(s). I certify medical necessity and need for MRI. Patient was ordered a MRI of the right femur for femoral stress fracture. They will call to schedule the MRI and call the office once complete. The patient was given a prescription for physical therapy.  Physical therapy is medically necessary to improve strength, balance, range of motion and functional outcomes after injury and/or surgery. Patient was given a handout and instructed on an at home stretching program.  They should do these exercises 3 times per day for 6 weeks and then daily.  Patient can use OTC Voltaren gel, biofreeze or  aspercream for pain and continue to ice and elevate supported at the calf to reduce swelling. All the patient's questions were answered. The patient agrees with the above plan.  Follow up with hip specialist and pending MRI results      Gustavo White PA-C

## 2024-09-23 NOTE — PATIENT INSTRUCTIONS
You were ordered a MRI of the left femoral.  Please call [phone] to schedule your MRI.  When your MRI is complete, please call the office at [phone] and leave your name and number.  We will call you back with your results    1. Follow stretching exercises that were on a separate handout   2. Hold each stretch for a least 1 minute  3. Do not bounce while stretching  4. Stretch for 10 minutes at a time, 3x a day for 6 weeks then daily  5. Remember, it takes several weeks to a few months of consistent stretching to increase flexibility and decrease symptoms.     You can use OTC Voltaren gel or aspercream and apply it to the injured area.    Ice and elevate supported at the calf with no pressure on the heel to reduce swelling.    Patient will increase their dietary calcium intake (milk,yogurt) and should get 1200 mg of calcium per day. They will also take a vitamin D supplement.    Patient will avoid impact activities such as running or jumping.  They can use a stationary bike, pool exercises and upper body training.    The patient was given a prescription for physical therapy.  Physical therapy is medically necessary to improve strength, balance, range of motion and functional outcomes after injury and/or surgery.    Follow up pending MRI results

## 2024-10-03 ENCOUNTER — APPOINTMENT (OUTPATIENT)
Dept: DERMATOLOGY | Facility: CLINIC | Age: 44
End: 2024-10-03
Payer: COMMERCIAL

## 2024-10-08 ENCOUNTER — HOSPITAL ENCOUNTER (OUTPATIENT)
Dept: RADIOLOGY | Facility: HOSPITAL | Age: 44
Discharge: HOME | End: 2024-10-08
Payer: COMMERCIAL

## 2024-10-08 DIAGNOSIS — M25.859 FEMORAL ACETABULAR IMPINGEMENT: ICD-10-CM

## 2024-10-08 DIAGNOSIS — M84.353A FEMORAL NECK STRESS FRACTURE, INITIAL ENCOUNTER: ICD-10-CM

## 2024-10-08 DIAGNOSIS — M53.3 SI (SACROILIAC) JOINT DYSFUNCTION: ICD-10-CM

## 2024-10-08 PROCEDURE — 73718 MRI LOWER EXTREMITY W/O DYE: CPT | Mod: LT

## 2024-10-08 PROCEDURE — 73718 MRI LOWER EXTREMITY W/O DYE: CPT | Mod: LEFT SIDE | Performed by: RADIOLOGY

## 2024-10-10 ENCOUNTER — TELEPHONE (OUTPATIENT)
Dept: PRIMARY CARE | Facility: CLINIC | Age: 44
End: 2024-10-10

## 2024-10-10 ENCOUNTER — TELEMEDICINE (OUTPATIENT)
Dept: PRIMARY CARE | Facility: CLINIC | Age: 44
End: 2024-10-10
Payer: COMMERCIAL

## 2024-10-10 DIAGNOSIS — M54.50 LOW BACK PAIN POTENTIALLY ASSOCIATED WITH RADICULOPATHY: Primary | ICD-10-CM

## 2024-10-10 PROCEDURE — 99214 OFFICE O/P EST MOD 30 MIN: CPT | Performed by: NURSE PRACTITIONER

## 2024-10-10 RX ORDER — GABAPENTIN 100 MG/1
CAPSULE ORAL
Qty: 120 CAPSULE | Refills: 5 | Status: SHIPPED | OUTPATIENT
Start: 2024-10-10

## 2024-10-10 NOTE — PROGRESS NOTES
Subjective   Patient ID: Catherine Jones is a 44 y.o. female who presents for Hip Pain, Back Pain, and Chronic Pelvic Pain.    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    Physical Exam  Constitutional:       Appearance: Normal appearance.   HENT:      Head: Normocephalic.      Nose: Nose normal.   Pulmonary:      Effort: Pulmonary effort is normal.   Abdominal:      General: Abdomen is flat.   Neurological:      Mental Status: She is alert and oriented to person, place, and time.   Psychiatric:         Mood and Affect: Mood normal.         Behavior: Behavior normal.         Assessment/Plan   Problem List Items Addressed This Visit    None  Visit Diagnoses         Codes    Low back pain potentially associated with radiculopathy    -  Primary M54.50    Relevant Medications    gabapentin (Neurontin) 100 mg capsule    Other Relevant Orders    Referral to Physical Therapy

## 2024-11-25 ENCOUNTER — TELEPHONE (OUTPATIENT)
Dept: PRIMARY CARE | Facility: CLINIC | Age: 44
End: 2024-11-25
Payer: COMMERCIAL

## 2024-11-25 DIAGNOSIS — Z12.31 ENCOUNTER FOR SCREENING MAMMOGRAM FOR BREAST CANCER: Primary | ICD-10-CM

## 2024-11-25 DIAGNOSIS — I10 ESSENTIAL (PRIMARY) HYPERTENSION: ICD-10-CM

## 2024-11-25 RX ORDER — LISINOPRIL AND HYDROCHLOROTHIAZIDE 10; 12.5 MG/1; MG/1
1 TABLET ORAL DAILY
Qty: 90 TABLET | Refills: 1 | Status: SHIPPED | OUTPATIENT
Start: 2024-11-25

## 2024-11-25 NOTE — TELEPHONE ENCOUNTER
Patient messaged asking for a refill of     lisinopriL-hydrochlorothiazide 10-12.5 mg tablet   Please send to preferred pharmacy.    TY

## 2024-12-16 ENCOUNTER — ANCILLARY PROCEDURE (OUTPATIENT)
Dept: URGENT CARE | Age: 44
End: 2024-12-16
Payer: COMMERCIAL

## 2024-12-16 ENCOUNTER — OFFICE VISIT (OUTPATIENT)
Dept: URGENT CARE | Age: 44
End: 2024-12-16
Payer: COMMERCIAL

## 2024-12-16 VITALS
HEART RATE: 64 BPM | DIASTOLIC BLOOD PRESSURE: 79 MMHG | OXYGEN SATURATION: 96 % | SYSTOLIC BLOOD PRESSURE: 146 MMHG | RESPIRATION RATE: 18 BRPM | TEMPERATURE: 98.2 F

## 2024-12-16 DIAGNOSIS — J18.9 PNEUMONIA OF LEFT LOWER LOBE DUE TO INFECTIOUS ORGANISM: Primary | ICD-10-CM

## 2024-12-16 DIAGNOSIS — R05.9 COUGH, UNSPECIFIED TYPE: ICD-10-CM

## 2024-12-16 PROCEDURE — 3077F SYST BP >= 140 MM HG: CPT | Performed by: EMERGENCY MEDICINE

## 2024-12-16 PROCEDURE — 71046 X-RAY EXAM CHEST 2 VIEWS: CPT | Performed by: EMERGENCY MEDICINE

## 2024-12-16 PROCEDURE — 1036F TOBACCO NON-USER: CPT | Performed by: EMERGENCY MEDICINE

## 2024-12-16 PROCEDURE — 3078F DIAST BP <80 MM HG: CPT | Performed by: EMERGENCY MEDICINE

## 2024-12-16 PROCEDURE — 99214 OFFICE O/P EST MOD 30 MIN: CPT | Performed by: EMERGENCY MEDICINE

## 2024-12-16 RX ORDER — AZITHROMYCIN 250 MG/1
TABLET, FILM COATED ORAL
Qty: 6 TABLET | Refills: 0 | Status: SHIPPED | OUTPATIENT
Start: 2024-12-16 | End: 2024-12-21

## 2024-12-16 ASSESSMENT — ENCOUNTER SYMPTOMS
FATIGUE: 1
COUGH: 1

## 2024-12-16 NOTE — PROGRESS NOTES
Subjective   Patient ID: Catherine Jones is a 44 y.o. female. They present today with a chief complaint of Cough (Cough with phlegm for 3 days. Cough causing headaches. Lingering cough since last week) and Generalized Body Aches (8 days ago).    History of Present Illness  HPI  This is a 44-year-old -American female presents today complaining of cough productive of yellow sputum for the past 3 days.  Patient also complains some mild headache.  She complains of generalized body ache.  She denies any shortness of breath.  She denies any chest pain.  Past Medical History  Allergies as of 2024 - Reviewed 2024   Allergen Reaction Noted    Ferumoxytol Itching and Other 10/11/2019    Ketorolac Nausea Only 2023    Sulfamethoxazole-trimethoprim Nausea Only, Unknown, and Other 2016       (Not in a hospital admission)       Past Medical History:   Diagnosis Date    Other specified diseases of liver 2020    Liver cyst    Personal history of other diseases of the digestive system 2015    History of constipation    Personal history of other diseases of the digestive system 10/07/2015    History of constipation    Personal history of other diseases of urinary system 2017    History of hematuria       Past Surgical History:   Procedure Laterality Date     SECTION, CLASSIC  2015     Section    CHOLECYSTECTOMY  2015    Cholecystectomy Laparoscopic        reports that she has never smoked. She has never used smokeless tobacco. She reports that she does not drink alcohol and does not use drugs.    Review of Systems  Review of Systems   Constitutional:  Positive for fatigue.   Respiratory:  Positive for cough.    All other systems reviewed and are negative.                                 Objective    Vitals:    24 1654   BP: 146/79   Pulse: 64   Resp: 18   Temp: 36.8 °C (98.2 °F)   SpO2: 96%     Patient's last menstrual period was 2024  (approximate).    Physical Exam  Patient is awake alert oriented x 3 in no acute distress vital signs are stable.  She is afebrile.  HEENT examination is unremarkable.  Cardiovascular is regular rate and rhythm  Lungs with harsh breath sounds in left lower lobe.  Procedures    Point of Care Test & Imaging Results from this visit  No results found for this visit on 12/16/24.   XR chest 2 views    Result Date: 12/16/2024  Interpreted By:  Jamie Mandel, STUDY: XR CHEST 2 VIEWS;  12/16/2024 5:11 pm   INDICATION: Signs/Symptoms:cough.   ,R05.9 Cough, unspecified   COMPARISON: 03/10/2022   ACCESSION NUMBER(S): [id number]   ORDERING CLINICIAN: BRYON MCCARTHY   FINDINGS:         CARDIOMEDIASTINAL SILHOUETTE: Cardiomediastinal silhouette is normal in size and configuration.   LUNGS: Increased interstitial lung markings at the lower lungs bilaterally worse on the left. There is no dense consolidation . There is no effusion.   ABDOMEN: No remarkable upper abdominal findings.   BONES: No acute osseous changes.       1.  Increased lung markings bilaterally predominantly on the left. Underlying atypical/vital pneumonia high in the differential       MACRO: None   Signed by: Jamie Mandel 12/16/2024 5:17 PM Dictation workstation:   [id number]     Diagnostic study results (if any) were reviewed by Bryon Mccarthy DO.    Assessment/Plan   Allergies, medications, history, and pertinent labs/EKGs/Imaging reviewed by Bryon Mccarthy DO.     Medical Decision Making  Patient was informed of her x-ray finding.  In light of the fact that the patient is having productive yellow sputum the patient will be placed on antibiotic.    Orders and Diagnoses  Diagnoses and all orders for this visit:  Cough, unspecified type  -     XR chest 2 views      Medical Admin Record      Patient disposition: Home    Electronically signed by Bryon Mccarthy DO  5:25 PM

## 2025-01-08 DIAGNOSIS — R05.2 SUBACUTE COUGH: Primary | ICD-10-CM

## 2025-01-08 DIAGNOSIS — M25.552 PAIN OF LEFT HIP: ICD-10-CM

## 2025-01-08 RX ORDER — BENZONATATE 200 MG/1
200 CAPSULE ORAL 3 TIMES DAILY PRN
Qty: 20 CAPSULE | Refills: 1 | Status: SHIPPED | OUTPATIENT
Start: 2025-01-08

## 2025-01-08 RX ORDER — ALBUTEROL SULFATE 90 UG/1
2 INHALANT RESPIRATORY (INHALATION) EVERY 6 HOURS PRN
Qty: 18 G | Refills: 11 | Status: SHIPPED | OUTPATIENT
Start: 2025-01-08 | End: 2026-01-08

## 2025-01-08 RX ORDER — CODEINE PHOSPHATE AND GUAIFENESIN 10; 100 MG/5ML; MG/5ML
5 SOLUTION ORAL NIGHTLY PRN
Qty: 473 ML | Refills: 0 | Status: SHIPPED | OUTPATIENT
Start: 2025-01-08 | End: 2025-01-15

## 2025-01-08 NOTE — PROGRESS NOTES
Patient post pneumonia still with cough.  Gave her albuterol, guaifenesin with codeine and tessalon perles.  Also for hip pain referral to [facility].

## 2025-03-13 ENCOUNTER — ANCILLARY PROCEDURE (OUTPATIENT)
Dept: URGENT CARE | Age: 45
End: 2025-03-13
Payer: COMMERCIAL

## 2025-03-13 ENCOUNTER — APPOINTMENT (OUTPATIENT)
Dept: URGENT CARE | Age: 45
End: 2025-03-13
Payer: COMMERCIAL

## 2025-03-13 ENCOUNTER — OFFICE VISIT (OUTPATIENT)
Dept: URGENT CARE | Age: 45
End: 2025-03-13
Payer: COMMERCIAL

## 2025-03-13 VITALS
HEART RATE: 71 BPM | RESPIRATION RATE: 18 BRPM | OXYGEN SATURATION: 97 % | BODY MASS INDEX: 39.33 KG/M2 | SYSTOLIC BLOOD PRESSURE: 127 MMHG | DIASTOLIC BLOOD PRESSURE: 77 MMHG | WEIGHT: 240 LBS | TEMPERATURE: 98.2 F

## 2025-03-13 DIAGNOSIS — R05.9 COUGH, UNSPECIFIED TYPE: Primary | ICD-10-CM

## 2025-03-13 PROCEDURE — 71046 X-RAY EXAM CHEST 2 VIEWS: CPT | Performed by: EMERGENCY MEDICINE

## 2025-03-13 ASSESSMENT — ENCOUNTER SYMPTOMS: COUGH: 1

## 2025-03-13 NOTE — PROGRESS NOTES
Subjective   Patient ID: Catherine Jones is a 44 y.o. female. They present today with a chief complaint of Cough (4 Months - Cough, Chest Congestion Pt was dx with pneumonia still coughing at night and wheezing with mucus ) and Neck Pain (Left Neck pain ).    History of Present Illness  HPI  This is a 44-year-old female who was seen 2 months ago was diagnosed with pneumonia and treated with Zithromax returns today complaining of some persistent cough and congestion in the morning only.  The patient was also placed by her primary physician on Tessalon Perles and an inhaler.  She denies any fever or chills.  She also complained of left-sided neck pain.  She denies any shortness of breath.  Patient is requesting repeat chest x-ray  Past Medical History  Allergies as of 2025 - Reviewed 2025   Allergen Reaction Noted    Ferumoxytol Itching and Other 10/11/2019    Ketorolac Nausea Only 2023    Sulfamethoxazole-trimethoprim Nausea Only, Unknown, and Other 2016       (Not in a hospital admission)       Past Medical History:   Diagnosis Date    Other specified diseases of liver 2020    Liver cyst    Personal history of other diseases of the digestive system 2015    History of constipation    Personal history of other diseases of the digestive system 10/07/2015    History of constipation    Personal history of other diseases of urinary system 2017    History of hematuria       Past Surgical History:   Procedure Laterality Date     SECTION, CLASSIC  2015     Section    CHOLECYSTECTOMY  2015    Cholecystectomy Laparoscopic        reports that she has never smoked. She has never used smokeless tobacco. She reports that she does not drink alcohol and does not use drugs.    Review of Systems  Review of Systems   HENT:  Positive for congestion.    Respiratory:  Positive for cough.    All other systems reviewed and are negative.                                  Objective    Vitals:    03/13/25 1738   BP: 127/77   Pulse: 71   Resp: 18   Temp: 36.8 °C (98.2 °F)   SpO2: 97%   Weight: 109 kg (240 lb)     No LMP recorded.    Physical Exam  The patient is awake alert oriented x 3 in no acute distress vital signs are stable.  She is afebrile.  Neck supple.  No anterior cervical adenopathy.  No anterior neck swelling could be appreciated.  Airway is patent.  No trismus.  Cardiovascular is regular rate and rhythm.  Lungs are clear throughout.  Procedures    Point of Care Test & Imaging Results from this visit  No results found for this visit on 03/13/25.   XR chest 2 views    Result Date: 3/13/2025  Interpreted By:  Leif Dudley, STUDY: XR CHEST 2 VIEWS   INDICATION: Signs/Symptoms:cough.   COMPARISON: December 16, 2024   ACCESSION NUMBER(S): [id number]   ORDERING CLINICIAN: BRYON MCCARTHY   FINDINGS: No consolidation, effusion, edema, pneumothorax. Heart size within normal limits.       No evidence of acute intrathoracic abnormality.   Signed by: Leif Dudley 3/13/2025 5:56 PM Dictation workstation:   [id number]     Diagnostic study results (if any) were reviewed by Bryon Mccarthy DO.    Assessment/Plan   Allergies, medications, history, and pertinent labs/EKGs/Imaging reviewed by Bryon Mccarthy DO.     Medical Decision Making  Patient was advised to follow-up with her family physician to rule out any neck masses if the patient continues to feel left-sided neck swelling    Orders and Diagnoses  Diagnoses and all orders for this visit:  Cough, unspecified type  -     XR chest 2 views      Medical Admin Record      Patient disposition: Home    Electronically signed by Bryon Mccarthy DO  6:05 PM

## 2025-04-07 ENCOUNTER — OFFICE VISIT (OUTPATIENT)
Dept: CARDIOLOGY | Facility: CLINIC | Age: 45
End: 2025-04-07
Payer: COMMERCIAL

## 2025-04-07 VITALS
HEIGHT: 65 IN | OXYGEN SATURATION: 98 % | WEIGHT: 249 LBS | DIASTOLIC BLOOD PRESSURE: 77 MMHG | BODY MASS INDEX: 41.48 KG/M2 | RESPIRATION RATE: 18 BRPM | HEART RATE: 87 BPM | SYSTOLIC BLOOD PRESSURE: 156 MMHG

## 2025-04-07 DIAGNOSIS — R07.9 CHEST PAIN, UNSPECIFIED TYPE: Primary | ICD-10-CM

## 2025-04-07 DIAGNOSIS — I10 ESSENTIAL HYPERTENSION: ICD-10-CM

## 2025-04-07 PROCEDURE — 93005 ELECTROCARDIOGRAM TRACING: CPT | Performed by: STUDENT IN AN ORGANIZED HEALTH CARE EDUCATION/TRAINING PROGRAM

## 2025-04-07 PROCEDURE — 3075F SYST BP GE 130 - 139MM HG: CPT | Performed by: STUDENT IN AN ORGANIZED HEALTH CARE EDUCATION/TRAINING PROGRAM

## 2025-04-07 PROCEDURE — 99204 OFFICE O/P NEW MOD 45 MIN: CPT | Performed by: STUDENT IN AN ORGANIZED HEALTH CARE EDUCATION/TRAINING PROGRAM

## 2025-04-07 PROCEDURE — 3078F DIAST BP <80 MM HG: CPT | Performed by: STUDENT IN AN ORGANIZED HEALTH CARE EDUCATION/TRAINING PROGRAM

## 2025-04-07 PROCEDURE — 1036F TOBACCO NON-USER: CPT | Performed by: STUDENT IN AN ORGANIZED HEALTH CARE EDUCATION/TRAINING PROGRAM

## 2025-04-07 PROCEDURE — 99214 OFFICE O/P EST MOD 30 MIN: CPT | Performed by: STUDENT IN AN ORGANIZED HEALTH CARE EDUCATION/TRAINING PROGRAM

## 2025-04-07 PROCEDURE — 3008F BODY MASS INDEX DOCD: CPT | Performed by: STUDENT IN AN ORGANIZED HEALTH CARE EDUCATION/TRAINING PROGRAM

## 2025-04-07 ASSESSMENT — ENCOUNTER SYMPTOMS
LOSS OF SENSATION IN FEET: 0
DEPRESSION: 0
OCCASIONAL FEELINGS OF UNSTEADINESS: 0

## 2025-04-07 ASSESSMENT — PATIENT HEALTH QUESTIONNAIRE - PHQ9
2. FEELING DOWN, DEPRESSED OR HOPELESS: NOT AT ALL
SUM OF ALL RESPONSES TO PHQ9 QUESTIONS 1 AND 2: 0
1. LITTLE INTEREST OR PLEASURE IN DOING THINGS: NOT AT ALL

## 2025-04-07 ASSESSMENT — PAIN SCALES - GENERAL: PAINLEVEL_OUTOF10: 6

## 2025-04-07 NOTE — PROGRESS NOTES
Location of visit: [address]   Type of Visit: New    Chief Complaint:  Patient was referred to Cardiology for New Patient Visit, Hypertension, and Chest Pain.    History Of Present Illness:    Catherine Jones is a 44 y.o. female, with history significant for HTN, GERD, fatty liver, BMI 44, who visits Cardiology today as a new patient for chest pain. She reports poor adherence to blood pressure medications. Chest pain started after and episode of pneumonia and persistent cough. It is left parasternal and reproduced on palpation. Patient refers that wants to lose weight to possibly being able to control HTN only with diet.     Patient denies shortness of breath, orthopnea, PND, nocturia, edema, palpitations, dizziness, lightheadedness, syncope, claudication, or snoring/apnea.    Blood pressure: 156/77 mmHg  HR: 87 bpm    Today's ECG shows sinus arrhythmia at 71 bpm, normal AV conduction, and diffuse ventricular repolarization abnormalities.    Past Medical History:  She has a past medical history of Other specified diseases of liver (2020), Personal history of other diseases of the digestive system (2015), Personal history of other diseases of the digestive system (10/07/2015), and Personal history of other diseases of urinary system (2017).    Past Surgical History:  She has a past surgical history that includes  section, classic (2015) and Cholecystectomy (2015).    Social History:  She reports that she has never smoked. She has never used smokeless tobacco. She reports that she does not drink alcohol and does not use drugs.    Family History:  Family History   Problem Relation Name Age of Onset    Asthma Sibling      Other (hay fever/sesonal allergies) Child      Eczema Parent       Allergies:  Sulfamethoxazole-trimethoprim    Outpatient Medications:  Current Outpatient Medications   Medication Instructions    buPROPion XL (Wellbutrin XL) 150 mg 24 hr tablet Take one  "tablet po daily    cyclobenzaprine (FLEXERIL) 10 mg, oral, Daily before evening meal    ferrous sulfate 8.8 mg/mL elemental iron elixir Take 15 ml every other day for anemia    gabapentin (Neurontin) 100 mg capsule Take one capsule hs and increase by 1 capsule weekly until taking 6 hs for back pain    ibuprofen 600 mg, Every 6 hours PRN    lidocaine (Lidoderm) 5 % patch 1 patch, Topical, Daily PRN    lisinopriL-hydrochlorothiazide 10-12.5 mg tablet 1 tablet, oral, Daily    nystatin (Mycostatin) 100,000 unit/gram powder 1 Application, Topical, 2 times daily     Last Recorded Vitals:  Vitals:    04/07/25 1648 04/07/25 1654   BP: 135/79 156/77   BP Location: Left arm Right arm   Patient Position: Sitting Sitting   Pulse: 87    Resp: 18    SpO2: 98%    Weight: 113 kg (249 lb)    Height: 1.651 m (5' 5\")      Physical Exam:      4/7/2025     4:54 PM 4/7/2025     4:48 PM 3/13/2025     5:38 PM 12/16/2024     4:54 PM 9/23/2024     8:36 AM 8/28/2024     4:50 PM   Vitals   Systolic 156 135 127 146  149   Diastolic 77 79 77 79  77   BP Location Right arm Left arm       Heart Rate  87 71 64  72   Temp   36.8 °C (98.2 °F) 36.8 °C (98.2 °F)  37 °C (98.6 °F)   Resp  18 18 18  18   Height  1.651 m (5' 5\")   1.664 m (5' 5.5\") 1.651 m (5' 5\")   Weight (lb)  249 240  240 233.91   BMI  41.44 kg/m2 39.33 kg/m2  39.33 kg/m2 38.92 kg/m2   BSA (m2)  2.28 m2 2.24 m2  2.24 m2 2.2 m2   Visit Report Report Report Report Report Report      Wt Readings from Last 5 Encounters:   04/07/25 113 kg (249 lb)   03/13/25 109 kg (240 lb)   09/23/24 109 kg (240 lb)   08/28/24 106 kg (233 lb 14.5 oz)   01/04/24 107 kg (235 lb 0.2 oz)     General: Sitting up comfortably in chair; in no apparent distress.  HEENT: Normocephalic; atraumatic. Well hydrated.  Eyes: Anicteric sclera. Extraocular movement intact.  Neck: Supple; no thyromegaly; normal jugular venous pressure, no bruits.  Respiratory: Bilateral air entry equal. No wheezing.  Cardiovascular: Normal " S1, S2; no murmurs auscultated.  Abdomen: Nondistended; nontender. (+) bowel sounds.  Extremities: No peripheral edema present. Pulses 2+ diffusely.  Neurological: Oriented to time, place, and person; nonfocal.  Psychiatric: Normal affect.     Last Labs Reviewed:  CBC -  Recent Labs     04/22/24  1444 01/04/24  1252 04/11/22  1245 07/30/21  1559 03/03/21  1857   WBC 7.7 7.6 5.8 7.8 6.7   HGB 10.8* 11.3* 13.0 12.7 12.8   HCT 34.0* 34.8* 40.5 39.5 37.8   * 470* 406 403 377   MCV 73* 71* 80 80 80     CMP -  Recent Labs     04/22/24  1444 01/04/24  1252 04/11/22  1245 03/03/21  1857 02/24/20  1600    137 139 140 138   K 3.9 3.9 4.4 3.8 3.5    103 102 104 102   CO2 27 27 29 29 27   ANIONGAP 12 11 12 11 13   BUN 11 8 11 7 5*   CREATININE 0.99 0.94 0.98 0.92 1.06*   EGFR 72 76  --   --   --    CALCIUM 9.5 9.2 10.0 9.1 9.3     Recent Labs     04/22/24  1444 01/04/24  1252 04/11/22  1245 03/03/21  1857 02/24/20  1600 10/01/19  1205 09/17/19  1726   ALBUMIN 4.0 4.0 4.0 3.8 4.1   < > 4.3   ALKPHOS 66 57 55 58 58   < > 66   ALT 11 11 13 14 30   < > 16   AST 29 29 31 35 53*   < > 37   BILITOT 0.5 0.7 0.7 0.6 0.5   < > 0.5   LIPASE  --  60  --  68  --   --  69    < > = values in this interval not displayed.     LIPID PANEL -   Recent Labs     03/07/18  1137   CHOL 134   LDLF 76   HDL 41.6   TRIG 82     COAGULATION PANEL -  Recent Labs     10/01/19  1205   HAPTOGLOBIN 199     HEME/ENDO -  Recent Labs     04/22/24  1444 04/11/22  1245 07/30/21  1559 03/03/21  1600 02/27/20  1506 01/24/20  1117 11/12/19  1511 10/01/19  1205 10/01/19  1205 09/17/19  1726   FERRITIN 19  --   --   --   --  21 76  --  <8*  --    IRONSAT 6*  --  8* 19* 31 19* 20*   < > 11*  --    TSH 1.88  --   --   --   --   --   --   --   --  1.75   HGBA1C  --  5.5  --   --   --   --   --   --   --   --    CORTISOL  --   --   --   --   --   --   --   --  5.0  --     < > = values in this interval not displayed.     Last Cardiology/Imaging Tests  Personally Reviewed (if images available) and Interpreted:  EC2010 Sinus rhythm at 53 bpm, normal AV conduction, normal QRS axis, and nonspecific T wave abnormality.     Echocardiogram:  No results found.    Cath:  No results found.    Stress Test:  No results found.    Cardiac CT/MRI:  No results found.    CV RISK FACTORS:   # Hypertension: Last BP: 156/77.  # Hyperlipidemia: Last Tchol No results found for requested labs within last 365 days. / LDL No results found for requested labs within last 365 days. / HDL No results found for requested labs within last 365 days. / TRIG No results found for requested labs within last 365 days. (No results in last year.).  # Type II Diabetes Mellitus: Last A1c No results found for requested labs within last 365 days. (No results in last year.).  # Obesity: Last BMI: 41.44.  # CKD: Last BUN/Cr (GFR): 11/0.99 (72), 2024:  2:44 PM.    ASCV RISK:  The ASCVD Risk score (Donell COHEN, et al., 2019) failed to calculate for the following reasons:    Cannot find a previous HDL lab    Cannot find a previous total cholesterol lab    Assessment:  44 y.o. female, with history significant for HTN, GERD, fatty liver, BMI 44, who visits Cardiology today as a new patient for chest pain. Pain is most likely a costochondritis post pneumonia. No red flag findings or symptoms to suggest CAD. We discussed about the need to adhere to her HTN treatment and the possibility of stopping medications if she achieves an optimal BMI/fat percentage and increases her muscle mass. She will discuss plan with her PCP and get a .    Assessment & Plan  Chest pain, unspecified type  - Non cardiac, most likely costochondritis  - Recommended symptomatic treatment with Tylenol  Essential hypertension  - Continue actual treatment improving adherence  - Follow up with PCP if optimal blood pressure range is achieved    No Cardiology follow up required, patient will return as needed  I spent 45 minutes  assessing the case between pre-charting, face-to-face patient interaction, and documentation    Theo Tello MD

## 2025-04-08 LAB
ATRIAL RATE: 71 BPM
P AXIS: 76 DEGREES
P OFFSET: 202 MS
P ONSET: 146 MS
PR INTERVAL: 154 MS
Q ONSET: 223 MS
QRS COUNT: 11 BEATS
QRS DURATION: 76 MS
QT INTERVAL: 382 MS
QTC CALCULATION(BAZETT): 415 MS
QTC FREDERICIA: 404 MS
R AXIS: 60 DEGREES
T AXIS: 54 DEGREES
T OFFSET: 414 MS
VENTRICULAR RATE: 71 BPM

## 2025-04-09 ENCOUNTER — OFFICE VISIT (OUTPATIENT)
Dept: PRIMARY CARE | Facility: CLINIC | Age: 45
End: 2025-04-09
Payer: COMMERCIAL

## 2025-04-09 VITALS
SYSTOLIC BLOOD PRESSURE: 132 MMHG | HEIGHT: 65 IN | WEIGHT: 245 LBS | BODY MASS INDEX: 40.82 KG/M2 | DIASTOLIC BLOOD PRESSURE: 88 MMHG | HEART RATE: 76 BPM

## 2025-04-09 DIAGNOSIS — Z13.29 SCREENING FOR THYROID DISORDER: ICD-10-CM

## 2025-04-09 DIAGNOSIS — E55.9 VITAMIN D DEFICIENCY: ICD-10-CM

## 2025-04-09 DIAGNOSIS — Z13.220 SCREENING FOR LIPID DISORDERS: ICD-10-CM

## 2025-04-09 DIAGNOSIS — Z12.11 COLON CANCER SCREENING: ICD-10-CM

## 2025-04-09 DIAGNOSIS — Z00.00 ROUTINE GENERAL MEDICAL EXAMINATION AT A HEALTH CARE FACILITY: Primary | ICD-10-CM

## 2025-04-09 DIAGNOSIS — I10 HYPERTENSION, UNSPECIFIED TYPE: ICD-10-CM

## 2025-04-09 DIAGNOSIS — Z12.31 VISIT FOR SCREENING MAMMOGRAM: ICD-10-CM

## 2025-04-09 DIAGNOSIS — Z13.1 SCREENING FOR DIABETES MELLITUS: ICD-10-CM

## 2025-04-09 DIAGNOSIS — E66.01 MORBID OBESITY WITH BMI OF 40.0-44.9, ADULT (MULTI): ICD-10-CM

## 2025-04-09 DIAGNOSIS — D50.0 IRON DEFICIENCY ANEMIA DUE TO CHRONIC BLOOD LOSS: ICD-10-CM

## 2025-04-09 DIAGNOSIS — N93.9 ABNORMAL UTERINE BLEEDING (AUB): ICD-10-CM

## 2025-04-09 PROBLEM — L83 ACANTHOSIS NIGRICANS: Status: ACTIVE | Noted: 2018-11-25

## 2025-04-09 PROBLEM — J06.9 ACUTE UPPER RESPIRATORY INFECTION: Status: ACTIVE | Noted: 2025-04-09

## 2025-04-09 PROBLEM — U07.1 DISEASE DUE TO SEVERE ACUTE RESPIRATORY SYNDROME CORONAVIRUS 2 (SARS-COV-2): Status: ACTIVE | Noted: 2025-04-09

## 2025-04-09 PROBLEM — R11.0 NAUSEA: Status: ACTIVE | Noted: 2025-04-09

## 2025-04-09 PROBLEM — E04.9 GOITER: Status: ACTIVE | Noted: 2025-04-09

## 2025-04-09 PROBLEM — U09.9 POST-ACUTE COVID-19 SYNDROME: Status: ACTIVE | Noted: 2025-04-09

## 2025-04-09 PROBLEM — R09.81 CONGESTION OF PARANASAL SINUS: Status: ACTIVE | Noted: 2025-04-09

## 2025-04-09 PROBLEM — R07.9 CHEST PAIN: Status: ACTIVE | Noted: 2025-04-09

## 2025-04-09 PROBLEM — E66.812 CLASS 2 OBESITY: Status: ACTIVE | Noted: 2025-04-09

## 2025-04-09 PROBLEM — N39.0 ACUTE LOWER URINARY TRACT INFECTION: Status: ACTIVE | Noted: 2025-04-09

## 2025-04-09 PROCEDURE — 3079F DIAST BP 80-89 MM HG: CPT | Performed by: FAMILY MEDICINE

## 2025-04-09 PROCEDURE — 3075F SYST BP GE 130 - 139MM HG: CPT | Performed by: FAMILY MEDICINE

## 2025-04-09 PROCEDURE — 99386 PREV VISIT NEW AGE 40-64: CPT | Performed by: FAMILY MEDICINE

## 2025-04-09 PROCEDURE — 3008F BODY MASS INDEX DOCD: CPT | Performed by: FAMILY MEDICINE

## 2025-04-09 ASSESSMENT — ENCOUNTER SYMPTOMS
LOSS OF SENSATION IN FEET: 0
OCCASIONAL FEELINGS OF UNSTEADINESS: 0
DEPRESSION: 0

## 2025-04-09 ASSESSMENT — PATIENT HEALTH QUESTIONNAIRE - PHQ9
2. FEELING DOWN, DEPRESSED OR HOPELESS: NOT AT ALL
1. LITTLE INTEREST OR PLEASURE IN DOING THINGS: NOT AT ALL
SUM OF ALL RESPONSES TO PHQ9 QUESTIONS 1 AND 2: 0

## 2025-04-09 NOTE — ASSESSMENT & PLAN NOTE
- Continue actual treatment improving adherence  - Follow up with PCP if optimal blood pressure range is achieved

## 2025-04-10 ENCOUNTER — TELEPHONE (OUTPATIENT)
Dept: PRIMARY CARE | Facility: CLINIC | Age: 45
End: 2025-04-10
Payer: COMMERCIAL

## 2025-04-12 LAB
25(OH)D3+25(OH)D2 SERPL-MCNC: 32 NG/ML (ref 30–100)
ALBUMIN SERPL-MCNC: 4.1 G/DL (ref 3.6–5.1)
ALP SERPL-CCNC: 57 U/L (ref 31–125)
ALT SERPL-CCNC: 12 U/L (ref 6–29)
ANION GAP SERPL CALCULATED.4IONS-SCNC: 9 MMOL/L (CALC) (ref 7–17)
AST SERPL-CCNC: 31 U/L (ref 10–35)
BILIRUB SERPL-MCNC: 0.5 MG/DL (ref 0.2–1.2)
BUN SERPL-MCNC: 9 MG/DL (ref 7–25)
CALCIUM SERPL-MCNC: 9.6 MG/DL (ref 8.6–10.2)
CHLORIDE SERPL-SCNC: 104 MMOL/L (ref 98–110)
CO2 SERPL-SCNC: 27 MMOL/L (ref 20–32)
CREAT SERPL-MCNC: 1.09 MG/DL (ref 0.5–0.99)
EGFRCR SERPLBLD CKD-EPI 2021: 63 ML/MIN/1.73M2
ERYTHROCYTE [DISTWIDTH] IN BLOOD BY AUTOMATED COUNT: 17.9 % (ref 11–15)
EST. AVERAGE GLUCOSE BLD GHB EST-MCNC: 120 MG/DL
EST. AVERAGE GLUCOSE BLD GHB EST-SCNC: 6.6 MMOL/L
FERRITIN SERPL-MCNC: 6 NG/ML (ref 16–232)
GLUCOSE SERPL-MCNC: 84 MG/DL (ref 65–139)
HBA1C MFR BLD: 5.8 % OF TOTAL HGB
HCT VFR BLD AUTO: 36.1 % (ref 35–45)
HGB BLD-MCNC: 11.7 G/DL (ref 11.7–15.5)
IRON SATN MFR SERPL: 10 % (CALC) (ref 16–45)
IRON SERPL-MCNC: 38 MCG/DL (ref 40–190)
MCH RBC QN AUTO: 24.2 PG (ref 27–33)
MCHC RBC AUTO-ENTMCNC: 32.4 G/DL (ref 32–36)
MCV RBC AUTO: 74.7 FL (ref 80–100)
PLATELET # BLD AUTO: 442 THOUSAND/UL (ref 140–400)
PMV BLD REES-ECKER: 9.6 FL (ref 7.5–12.5)
POTASSIUM SERPL-SCNC: 4.2 MMOL/L (ref 3.5–5.3)
PROT SERPL-MCNC: 7.1 G/DL (ref 6.1–8.1)
RBC # BLD AUTO: 4.83 MILLION/UL (ref 3.8–5.1)
SODIUM SERPL-SCNC: 140 MMOL/L (ref 135–146)
TIBC SERPL-MCNC: 396 MCG/DL (CALC) (ref 250–450)
TSH SERPL-ACNC: 1.99 MIU/L
VIT B12 SERPL-MCNC: 522 PG/ML (ref 200–1100)
WBC # BLD AUTO: 6.5 THOUSAND/UL (ref 3.8–10.8)

## 2025-04-13 PROBLEM — Z00.00 ROUTINE GENERAL MEDICAL EXAMINATION AT A HEALTH CARE FACILITY: Status: ACTIVE | Noted: 2025-04-13

## 2025-04-13 PROBLEM — R11.0 NAUSEA: Status: RESOLVED | Noted: 2025-04-09 | Resolved: 2025-04-13

## 2025-04-13 PROBLEM — Z12.31 VISIT FOR SCREENING MAMMOGRAM: Status: ACTIVE | Noted: 2025-04-13

## 2025-04-13 PROBLEM — Z12.11 COLON CANCER SCREENING: Status: ACTIVE | Noted: 2025-04-13

## 2025-04-13 PROBLEM — N93.9 ABNORMAL UTERINE BLEEDING (AUB): Status: ACTIVE | Noted: 2025-04-13

## 2025-04-13 PROBLEM — Z13.1 SCREENING FOR DIABETES MELLITUS: Status: ACTIVE | Noted: 2025-04-13

## 2025-04-13 PROBLEM — R10.819 ABDOMINAL TENDERNESS: Status: RESOLVED | Noted: 2023-09-06 | Resolved: 2025-04-13

## 2025-04-13 PROBLEM — E66.812 CLASS 2 OBESITY: Status: RESOLVED | Noted: 2025-04-09 | Resolved: 2025-04-13

## 2025-04-13 PROBLEM — Z13.220 SCREENING FOR LIPID DISORDERS: Status: ACTIVE | Noted: 2025-04-13

## 2025-04-13 PROBLEM — Z13.29 SCREENING FOR THYROID DISORDER: Status: ACTIVE | Noted: 2025-04-13

## 2025-04-13 PROBLEM — E66.01 MORBID OBESITY WITH BMI OF 40.0-44.9, ADULT (MULTI): Status: ACTIVE | Noted: 2025-04-13

## 2025-04-13 PROBLEM — E66.01 SEVERE OBESITY (BMI 35.0-39.9) WITH COMORBIDITY (MULTI): Status: RESOLVED | Noted: 2023-09-06 | Resolved: 2025-04-13

## 2025-04-13 PROBLEM — R10.11 ABDOMINAL PAIN, RUQ (RIGHT UPPER QUADRANT): Status: RESOLVED | Noted: 2023-09-06 | Resolved: 2025-04-13

## 2025-04-13 LAB
CHOLEST SERPL-MCNC: 158 MG/DL
CHOLEST/HDLC SERPL: 3.4 (CALC)
CORTIS AM PEAK SERPL-MCNC: 8.3 MCG/DL
HDLC SERPL-MCNC: 47 MG/DL
LDLC SERPL CALC-MCNC: 94 MG/DL (CALC)
NONHDLC SERPL-MCNC: 111 MG/DL (CALC)
TRIGL SERPL-MCNC: 76 MG/DL

## 2025-04-13 NOTE — ASSESSMENT & PLAN NOTE
- Continue actual treatment improving adherence  What is High Blood Pressure? High blood pressure (also called hypertension) is when your blood moves through your arteries at a higher pressure than normal and that forces your heart to work harder to pump the blood.     What are the Complications of High Blood Pressure? When your blood pressure gets too high or stays high for a long time, it can cause health problems such as:    Kidney disease or kidney failure   Heart attack and heart failure   Stroke   Vision problems   Circulation problems, poor blood supply to the legs    WHAT are the causes of High Blood Pressure? There are many different causes and your provider can help you find out what might be causing your pressure to be too high.     What are the Signs of High Blood Pressure? High blood pressure doesn’t usually have warning signs or symptoms, so many people don’t realize they have it and that is why regular monitoring is important.     Prevention and Treatment: Often high blood pressure can be controlled with lifestyle changes and when necessary, medications. Adopting heart healthy habits can help:      Maintain a Healthy Weight   Eat a heart healthy diet full of vegetables, fruits and whole grains that are high in fiber   Be Physically Active every day   Find heart healthy ways to reduce stress and increase relaxation    Don’t use tobacco products   Limit your intake of alcohol, caffeine and salt   Monitor your blood pressure regularly: ask your provider how often   Take your medications as prescribed, even when you’re feeling well

## 2025-04-13 NOTE — PROGRESS NOTES
"Subjective   Patient ID: Catherine Jones is a 44 y.o. female who presents for No chief complaint on file..    Last Physical : ____ Years ago     Pt's PMH, PSH, SH, FH , meds and allergies was obtained / reviewed and updated .     Dental  Visits : Y  Vision issues : N  Hearing issues : N    Immunizations : Y    Diet :  Could be better   Exercise:    Weight concerns :    Alcohol: as noted in   Tobacco: as noted in   Recreational drugs :  None /as noted in      Metabolic screening   - Lipids   - Glucose   WANTSTO CONSIDER glp 1  BP taking meds of and on. Goal is to lose weight and get of meds  Has had heavy periods.  ==================================    Visit Vitals  /88   Pulse 76   Ht 1.651 m (5' 5\")   Wt 111 kg (245 lb)   BMI 40.77 kg/m²   OB Status Having periods   Smoking Status Never   BSA 2.26 m²      =====================  Review of Systems:    Constitutional: no chills, no fever and no night sweats.     Eyes: no blurred vision and no eyesight problems.     ENT: no hearing loss, no nasal congestion, no nasal discharge, no hoarseness and no sore throat.     Cardiovascular: no chest pain, no intermittent leg claudication, no lower extremity edema, no palpitations and no syncope.     Respiratory: no cough, no shortness of breath     Gastrointestinal: no abdominal pain,  no nausea, no rectal pain and no vomiting.     Genitourinary: no dysuria, no change in urinary frequency,.     Musculoskeletal: no arthralgias, and no myalgias.     Integumentary: no new skin lesions and no rashes.     Neurological: no difficulty walking, no headache,     Psychiatric: no anxiety, no depression, no anhedonia and no substance use disorders.            All other systems have been reviewed and are negative for complaint.    =====================================================    Physical exam :    Constitutional: Alert and in no acute distress. Well developed, well nourished.     Eyes: Normal external exam. Pupils were " equal in size, round, reactive to light (PERRL) with normal accommodation and extraocular movements intact (EOMI).     Ears, Nose, Mouth, and Throat: External inspection of ears and nose: Normal.  Otoscopic examination: Normal.      Neck: No neck mass was observed. Supple.     Cardiovascular: Heart rate and rhythm were normal, normal S1 and S2, no gallops, no murmurs and no pericardial rub    Pulmonary: No respiratory distress. Clear bilateral breath sounds.     Abdomen: Soft nontender; no abdominal mass palpated. No organomegaly.     Musculoskeletal: No joint swelling seen, normal movements of all extremities. Range of motion: Normal.  Muscle strength/tone: Normal.      Skin: Normal skin color and pigmentation, normal skin turgor, and no rash.     Neurologic: Deep tendon reflexes were 2+ and symmetric. Sensation: Normal.     Psychiatric: Judgment and insight: Intact. Mood and affect: Normal.    Lymphatic : Cervical/ axillary/ groin Lns Palpable/ non palpable       Assessment/Plan    Problem List Items Addressed This Visit       Anemia    Relevant Orders    Iron and TIBC (Completed)    Ferritin (Completed)    Vitamin B12 (Completed)    Essential hypertension     - Continue actual treatment improving adherence  What is High Blood Pressure? High blood pressure (also called hypertension) is when your blood moves through your arteries at a higher pressure than normal and that forces your heart to work harder to pump the blood.     What are the Complications of High Blood Pressure? When your blood pressure gets too high or stays high for a long time, it can cause health problems such as:    Kidney disease or kidney failure   Heart attack and heart failure   Stroke   Vision problems   Circulation problems, poor blood supply to the legs    WHAT are the causes of High Blood Pressure? There are many different causes and your provider can help you find out what might be causing your pressure to be too high.     What are the  Signs of High Blood Pressure? High blood pressure doesn’t usually have warning signs or symptoms, so many people don’t realize they have it and that is why regular monitoring is important.     Prevention and Treatment: Often high blood pressure can be controlled with lifestyle changes and when necessary, medications. Adopting heart healthy habits can help:      Maintain a Healthy Weight   Eat a heart healthy diet full of vegetables, fruits and whole grains that are high in fiber   Be Physically Active every day   Find heart healthy ways to reduce stress and increase relaxation    Don’t use tobacco products   Limit your intake of alcohol, caffeine and salt   Monitor your blood pressure regularly: ask your provider how often   Take your medications as prescribed, even when you’re feeling well            Vitamin D deficiency    Relevant Orders    Vitamin B12 (Completed)    Vitamin D 25-Hydroxy,Total (for eval of Vitamin D levels) (Completed)    Routine general medical examination at a health care facility - Primary    Relevant Orders    CBC (Completed)    TSH with reflex to Free T4 if abnormal (Completed)    Hemoglobin A1C (Completed)    Comprehensive Metabolic Panel (Completed)    Iron and TIBC (Completed)    Screening for diabetes mellitus    Relevant Orders    CBC (Completed)    TSH with reflex to Free T4 if abnormal (Completed)    Hemoglobin A1C (Completed)    Comprehensive Metabolic Panel (Completed)    Iron and TIBC (Completed)    Screening for thyroid disorder    Relevant Orders    CBC (Completed)    TSH with reflex to Free T4 if abnormal (Completed)    Hemoglobin A1C (Completed)    Comprehensive Metabolic Panel (Completed)    Iron and TIBC (Completed)    Screening for lipid disorders    Relevant Orders    Lipid Panel (Completed)    CBC (Completed)    TSH with reflex to Free T4 if abnormal (Completed)    Hemoglobin A1C (Completed)    Comprehensive Metabolic Panel (Completed)    Iron and TIBC (Completed)     Vitamin B12 (Completed)    Morbid obesity with BMI of 40.0-44.9, adult (Multi)    Relevant Orders    Cortisol AM (Completed)    Vitamin B12 (Completed)    CT cardiac scoring wo IV contrast    Colon cancer screening    Relevant Orders    Colonoscopy Screening; Average Risk Patient    Visit for screening mammogram    Relevant Orders    BI mammo bilateral screening tomosynthesis    Abnormal uterine bleeding (AUB)    Relevant Orders    US PELVIS TRANSABDOMINAL WITH TRANSVAGINAL

## 2025-04-15 DIAGNOSIS — D64.9 ANEMIA, UNSPECIFIED TYPE: ICD-10-CM

## 2025-04-15 RX ORDER — FERROUS SULFATE 220 (44)/5
88 SOLUTION, ORAL ORAL DAILY
Qty: 300 ML | Refills: 11 | Status: SHIPPED | OUTPATIENT
Start: 2025-04-15 | End: 2025-04-15

## 2025-04-15 RX ORDER — FERROUS SULFATE 220 (44)/5
ELIXIR ORAL
Qty: 300 ML | Refills: 11 | Status: SHIPPED | OUTPATIENT
Start: 2025-04-15

## 2025-04-15 NOTE — RESULT ENCOUNTER NOTE
Please call the patient regarding her result    Good cholesterol is low  recommend working out 30 minutes everyday to imporve good cholesterol and eating foods rich in Omega 3 like nuts, avocado, olive oil salmon  Iron level is very low though anemia has improved.   Recommend taking 10ml liquid iron everyday I sent script to [facility]  Iron saturation of red blood cells is low.   get more iron rich food in diet.  Dietary sources of iron include meat, especially organ meats, grains, fruits, and vegetables.  For people who do not eat meat, good plant sources of iron include whole or enriched breads or grains, iron-fortified cereals, legumes (beans, chickpeas, peanuts), green leafy vegetables, dried fruits, soy products, blackstrap molasses, bulgur, and wheat germ.  A1c is in the very early prediabetes range   Limit simple sugars and refined foods from your diet. Cut out soda, juice and juice drinks, alcohol, candy, table sugar and any other source of refined sugar. In addition, you may reduce your intake of refined starches like white rice, processed cereals, white bread, crackers, pretzels, white potatoes, regular pasta, and snacks. Consider switching to unrefined foods such as whole grain pasta, brown rice, whole grain cereals and breads, oatmeal, dried beans, lentils, and split peas. These foods are good sources of dietary fiber.This may help you to lose weight.   Creatinine is very  minimally elevated. Could be due to not drinking water before labs were drawn or could be due to uncontrolled high blood pressure.   Follow up with me in 3 months to recheck labs

## 2025-04-30 ENCOUNTER — HOSPITAL ENCOUNTER (OUTPATIENT)
Dept: RADIOLOGY | Facility: HOSPITAL | Age: 45
Discharge: HOME | End: 2025-04-30
Payer: COMMERCIAL

## 2025-04-30 ENCOUNTER — HOSPITAL ENCOUNTER (OUTPATIENT)
Dept: RADIOLOGY | Facility: CLINIC | Age: 45
Discharge: HOME | End: 2025-04-30
Payer: COMMERCIAL

## 2025-04-30 VITALS — BODY MASS INDEX: 40.77 KG/M2 | HEIGHT: 65 IN | WEIGHT: 244.71 LBS

## 2025-04-30 DIAGNOSIS — N93.9 ABNORMAL UTERINE BLEEDING (AUB): ICD-10-CM

## 2025-04-30 DIAGNOSIS — Z12.31 VISIT FOR SCREENING MAMMOGRAM: ICD-10-CM

## 2025-04-30 PROCEDURE — 76856 US EXAM PELVIC COMPLETE: CPT

## 2025-04-30 PROCEDURE — 77063 BREAST TOMOSYNTHESIS BI: CPT | Performed by: RADIOLOGY

## 2025-04-30 PROCEDURE — 76856 US EXAM PELVIC COMPLETE: CPT | Performed by: RADIOLOGY

## 2025-04-30 PROCEDURE — 76830 TRANSVAGINAL US NON-OB: CPT | Performed by: RADIOLOGY

## 2025-04-30 PROCEDURE — 77067 SCR MAMMO BI INCL CAD: CPT | Performed by: RADIOLOGY

## 2025-04-30 PROCEDURE — 77067 SCR MAMMO BI INCL CAD: CPT

## 2025-05-06 DIAGNOSIS — N83.8 OVARIAN MASS: ICD-10-CM

## 2025-05-06 DIAGNOSIS — D21.9 FIBROIDS: Primary | ICD-10-CM

## 2025-07-17 ENCOUNTER — APPOINTMENT (OUTPATIENT)
Dept: OBSTETRICS AND GYNECOLOGY | Facility: CLINIC | Age: 45
End: 2025-07-17
Payer: COMMERCIAL

## 2025-07-18 ENCOUNTER — LAB REQUISITION (OUTPATIENT)
Dept: LAB | Facility: HOSPITAL | Age: 45
End: 2025-07-18

## 2025-07-18 ENCOUNTER — OFFICE VISIT (OUTPATIENT)
Dept: OBSTETRICS AND GYNECOLOGY | Facility: CLINIC | Age: 45
End: 2025-07-18
Payer: COMMERCIAL

## 2025-07-18 VITALS
HEIGHT: 65 IN | DIASTOLIC BLOOD PRESSURE: 80 MMHG | WEIGHT: 219 LBS | SYSTOLIC BLOOD PRESSURE: 108 MMHG | BODY MASS INDEX: 36.49 KG/M2

## 2025-07-18 DIAGNOSIS — Z87.42 PERSONAL HISTORY OF OTHER DISEASES OF THE FEMALE GENITAL TRACT: ICD-10-CM

## 2025-07-18 DIAGNOSIS — D21.9 FIBROIDS: Primary | ICD-10-CM

## 2025-07-18 DIAGNOSIS — R10.2 PELVIC PAIN: ICD-10-CM

## 2025-07-18 DIAGNOSIS — D21.9 BENIGN NEOPLASM OF CONNECTIVE AND OTHER SOFT TISSUE, UNSPECIFIED: ICD-10-CM

## 2025-07-18 DIAGNOSIS — Z87.42 HISTORY OF HEAVY VAGINAL BLEEDING: ICD-10-CM

## 2025-07-18 LAB
ALBUMIN SERPL BCP-MCNC: 4.2 G/DL (ref 3.4–5)
ALP SERPL-CCNC: 55 U/L (ref 33–110)
ALT SERPL W P-5'-P-CCNC: 14 U/L (ref 7–45)
ANION GAP SERPL CALC-SCNC: 10 MMOL/L (ref 10–20)
AST SERPL W P-5'-P-CCNC: 33 U/L (ref 9–39)
BILIRUB SERPL-MCNC: 0.7 MG/DL (ref 0–1.2)
BUN SERPL-MCNC: 6 MG/DL (ref 6–23)
CALCIUM SERPL-MCNC: 9.6 MG/DL (ref 8.6–10.3)
CHLORIDE SERPL-SCNC: 101 MMOL/L (ref 98–107)
CO2 SERPL-SCNC: 31 MMOL/L (ref 21–32)
CREAT SERPL-MCNC: 1 MG/DL (ref 0.5–1.05)
EGFRCR SERPLBLD CKD-EPI 2021: 71 ML/MIN/1.73M*2
GLUCOSE SERPL-MCNC: 86 MG/DL (ref 74–99)
POTASSIUM SERPL-SCNC: 3.8 MMOL/L (ref 3.5–5.3)
PROT SERPL-MCNC: 7.4 G/DL (ref 6.4–8.2)
SODIUM SERPL-SCNC: 138 MMOL/L (ref 136–145)

## 2025-07-18 PROCEDURE — 3008F BODY MASS INDEX DOCD: CPT | Performed by: OBSTETRICS & GYNECOLOGY

## 2025-07-18 PROCEDURE — 80053 COMPREHEN METABOLIC PANEL: CPT

## 2025-07-18 PROCEDURE — 3079F DIAST BP 80-89 MM HG: CPT | Performed by: OBSTETRICS & GYNECOLOGY

## 2025-07-18 PROCEDURE — 3074F SYST BP LT 130 MM HG: CPT | Performed by: OBSTETRICS & GYNECOLOGY

## 2025-07-18 PROCEDURE — 99203 OFFICE O/P NEW LOW 30 MIN: CPT | Performed by: OBSTETRICS & GYNECOLOGY

## 2025-07-18 ASSESSMENT — ENCOUNTER SYMPTOMS
BACK PAIN: 1
CARDIOVASCULAR NEGATIVE: 1
MUSCULOSKELETAL NEGATIVE: 0
CONSTITUTIONAL NEGATIVE: 0
GASTROINTESTINAL NEGATIVE: 1
ALLERGIC/IMMUNOLOGIC NEGATIVE: 1
CARDIOVASCULAR NEGATIVE: 0
ALLERGIC/IMMUNOLOGIC NEGATIVE: 0
RESPIRATORY NEGATIVE: 1
JOINT SWELLING: 1
PSYCHIATRIC NEGATIVE: 0
HEMATOLOGIC/LYMPHATIC NEGATIVE: 1
EYES NEGATIVE: 0
RESPIRATORY NEGATIVE: 0
NEUROLOGICAL NEGATIVE: 0
ENDOCRINE NEGATIVE: 0
GASTROINTESTINAL NEGATIVE: 0
CONSTITUTIONAL NEGATIVE: 1
PSYCHIATRIC NEGATIVE: 1
EYES NEGATIVE: 1
NEUROLOGICAL NEGATIVE: 1
HEMATOLOGIC/LYMPHATIC NEGATIVE: 0

## 2025-07-18 ASSESSMENT — PAIN SCALES - GENERAL: PAINLEVEL_OUTOF10: 4

## 2025-07-18 NOTE — PATIENT INSTRUCTIONS
Thanks for coming in today for follow-up.    Labs were sent today to recheck your blood count and also your kidney and liver function in preparation for possible medications to manage your cycles.  Results should be available in the next 24 to 48 hours.  You may call the office and select option number to speak with the nurse to obtain the results.    Review the information about fibroids, Lysteda, ways to decrease heavy vaginal bleeding, etc.    Follow-up with pelvic floor physical therapy about your pelvic pain.    Follow-up with Dr. Corazon Rizzo, noninvasive specialist in our department about your pelvic pain, fibroids and persistent ovarian cyst.    Return the office for your annual GYN exam or as needed.    Return to the office with Dr. Rizzo to review your labs and come up with the plan of care.    Follow-up with your PCP and other healthcare specialist.

## 2025-07-18 NOTE — PROGRESS NOTES
Assessment and Plan:  44 y.o. complex stable right ovarian cyst seen on ultrasound and CT scan dating back from , chronic pelvic pain, and heavy vaginal bleeding without evidence of anemia.    #1 Complex Stable right ovarian cyst  #2 Chronic Pelvic Pain  #3 Heavy Vaginal Bleeding    Assessment/Plan:  1.  Complex Stable right ovarian cyst  - Discussed with patient ovarian cysts, discussed with patient that if it is bothersome for her she may want to consider surgical removal.  - Referral to Dr. Garcia for evaluation and follow up.    2.  Chronic Pelvic Pain  - PFPT referral made.  - Discussed with patient fibroids.  Patient to consider various forms of contraceptions as a way to manage pain and cycles.  Briefly discussed possible endometriosis.  - Information about fibroids provided.    3. Heavy Vaginal Bleeding   - In 2025 repeat CBC and CMP sent to check kidney and liver function for possibility of management with Lysteda vs hormone therapy.  - PFPT referral made.    Return to the office for  annual gyn exam and to follow up on labs.    Scribe Attestation  By signing my name below, I, Ambika Joy, attest that this documentation has been prepared under the direction and in the presence of Mary Munoz MD on 25 at 3:29 PM.     HPI:   44 y.o. woman presents today for AUB and complex ovarian mass.    - Patient reports experiencing sharp piercing pain on the right side and heavy menstrual bleeding and cramping.  - Patient reports constipation but denies pain with bowel movements.    Past medical hx:  - Sickle cell trait.    Family medical hx:  - Mother myocardial infarction at 43.  - Father diabetes and CVA    Surgical hx:  -  section  - Cholecystectomy  - [place] teeth removal    Social hx:  - Denies smoking, vaping, and drug use.  - Occasional alcohol use.    GYN hx:  - Menarche: 8 years old  - Menses: Heavy painful monthly cycle 7 days long.  Used OTC pain medication for pain  relief.  - Positive blood test for HSV but has never had any outbreak.  - Denies hx of abnormal pap smears.  - Denies hx of sexual abuse.  - Not sexually active  - Single  - Works on rotations for medical students.    OB hx:  -   section x2    ROS:  Review of Systems   Constitutional: Negative.    HENT: Negative.     Eyes: Negative.    Respiratory: Negative.     Cardiovascular: Negative.    Gastrointestinal: Negative.    Genitourinary:  Positive for pelvic pain.   Musculoskeletal:  Positive for back pain and joint swelling.   Skin: Negative.    Allergic/Immunologic: Negative.    Neurological: Negative.    Hematological: Negative.    Psychiatric/Behavioral: Negative.         Physical Exam:   Physical Exam  Constitutional:       General: She is not in acute distress.     Appearance: Normal appearance. She is well-developed.      Comments: Well- nourished and pleasant.   Physical exam deferred     Neurological:      General: No focal deficit present.      Mental Status: She is alert and oriented to person, place, and time.     Psychiatric:         Behavior: Behavior is cooperative.

## 2025-07-19 LAB
ERYTHROCYTE [DISTWIDTH] IN BLOOD BY AUTOMATED COUNT: 17.6 % (ref 11–15)
HCT VFR BLD AUTO: 37.5 % (ref 35–45)
HGB BLD-MCNC: 12 G/DL (ref 11.7–15.5)
MCH RBC QN AUTO: 24.4 PG (ref 27–33)
MCHC RBC AUTO-ENTMCNC: 32 G/DL (ref 32–36)
MCV RBC AUTO: 76.2 FL (ref 80–100)
PLATELET # BLD AUTO: 498 THOUSAND/UL (ref 140–400)
PMV BLD REES-ECKER: 9.5 FL (ref 7.5–12.5)
RBC # BLD AUTO: 4.92 MILLION/UL (ref 3.8–5.1)
WBC # BLD AUTO: 5.9 THOUSAND/UL (ref 3.8–10.8)

## 2025-07-23 ENCOUNTER — HOSPITAL ENCOUNTER (OUTPATIENT)
Dept: RADIOLOGY | Facility: HOSPITAL | Age: 45
Discharge: HOME | End: 2025-07-23
Payer: COMMERCIAL

## 2025-07-23 DIAGNOSIS — I10 HYPERTENSION, UNSPECIFIED TYPE: ICD-10-CM

## 2025-07-23 DIAGNOSIS — E66.01 MORBID OBESITY WITH BMI OF 40.0-44.9, ADULT (MULTI): ICD-10-CM

## 2025-07-23 PROCEDURE — 75571 CT HRT W/O DYE W/CA TEST: CPT

## 2025-08-09 ENCOUNTER — OFFICE VISIT (OUTPATIENT)
Dept: URGENT CARE | Age: 45
End: 2025-08-09
Payer: COMMERCIAL

## 2025-08-09 VITALS
HEART RATE: 71 BPM | WEIGHT: 210 LBS | SYSTOLIC BLOOD PRESSURE: 113 MMHG | HEIGHT: 65 IN | DIASTOLIC BLOOD PRESSURE: 78 MMHG | RESPIRATION RATE: 17 BRPM | OXYGEN SATURATION: 98 % | BODY MASS INDEX: 34.99 KG/M2 | TEMPERATURE: 98.2 F

## 2025-08-09 DIAGNOSIS — S61.011A LACERATION OF RIGHT THUMB WITHOUT FOREIGN BODY WITHOUT DAMAGE TO NAIL, INITIAL ENCOUNTER: Primary | ICD-10-CM

## 2025-08-09 RX ORDER — CEPHALEXIN 500 MG/1
500 CAPSULE ORAL 2 TIMES DAILY
Qty: 30 CAPSULE | Refills: 0 | Status: SHIPPED | OUTPATIENT
Start: 2025-08-09 | End: 2025-08-16

## 2025-08-09 ASSESSMENT — ENCOUNTER SYMPTOMS
ALLERGIC/IMMUNOLOGIC NEGATIVE: 1
RESPIRATORY NEGATIVE: 1
MUSCULOSKELETAL NEGATIVE: 1
WOUND: 1
GASTROINTESTINAL NEGATIVE: 1
EYES NEGATIVE: 1
ENDOCRINE NEGATIVE: 1
HEMATOLOGIC/LYMPHATIC NEGATIVE: 1
NEUROLOGICAL NEGATIVE: 1
CONSTITUTIONAL NEGATIVE: 1
CARDIOVASCULAR NEGATIVE: 1
PSYCHIATRIC NEGATIVE: 1

## 2025-08-09 ASSESSMENT — PAIN SCALES - GENERAL: PAINLEVEL_OUTOF10: 8

## 2025-08-09 NOTE — PROGRESS NOTES
"Subjective   Patient ID: Catherine Jones is a 44 y.o. female. They present today with a chief complaint of Finger Injury (Right thumb , cut while opening a can . Wasn't able to stop bleeding completely ).    History of Present Illness    History provided by:  Patient   used: No    Other  Location:  Right thumb injury  Severity:  Mild  Onset quality:  Sudden  Timing:  Constant  Progression:  Worsening  Chronicity:  New      Past Medical History  Allergies as of 08/09/2025 - Reviewed 08/09/2025   Allergen Reaction Noted    Sulfamethoxazole-trimethoprim Nausea Only, Unknown, and Other 11/09/2016       Prescriptions Prior to Admission[1]     Medical History[2]    Surgical History[3]     reports that she has never smoked. She has never used smokeless tobacco. She reports that she does not currently use alcohol. She reports that she does not use drugs.    Review of Systems  Review of Systems   Constitutional: Negative.    HENT: Negative.     Eyes: Negative.    Respiratory: Negative.     Cardiovascular: Negative.    Gastrointestinal: Negative.    Endocrine: Negative.    Genitourinary: Negative.    Musculoskeletal: Negative.    Skin:  Positive for wound.   Allergic/Immunologic: Negative.    Neurological: Negative.    Hematological: Negative.    Psychiatric/Behavioral: Negative.     All other systems reviewed and are negative.                                 Objective    Vitals:    08/09/25 1148   BP: 113/78   BP Location: Left arm   Patient Position: Sitting   BP Cuff Size: Adult   Pulse: 71   Resp: 17   Temp: 36.8 °C (98.2 °F)   TempSrc: Oral   SpO2: 98%   Weight: 95.3 kg (210 lb)   Height: 1.651 m (5' 5\")     Patient's last menstrual period was 08/09/2025.    Physical Exam  Vitals reviewed.   Constitutional:       Appearance: Normal appearance. She is normal weight.     Cardiovascular:      Rate and Rhythm: Normal rate and regular rhythm.      Pulses: Normal pulses.      Heart sounds: Normal heart " sounds.   Pulmonary:      Effort: Pulmonary effort is normal.      Breath sounds: Normal breath sounds.   Abdominal:      Palpations: Abdomen is soft.     Skin:     Findings: Erythema and laceration present.      Comments: 1.5 cm laceration right thumb     Neurological:      General: No focal deficit present.      Mental Status: She is alert and oriented to person, place, and time. Mental status is at baseline.     Psychiatric:         Mood and Affect: Mood normal.         Behavior: Behavior normal.         Thought Content: Thought content normal.         Judgment: Judgment normal.         Laceration Repair    Date/Time: 8/9/2025 8:25 PM    Performed by: GUILLE Lopez  Authorized by: GUILLE Lopez    Consent:     Consent obtained:  Written    Consent given by:  Patient    Risks, benefits, and alternatives were discussed: yes      Risks discussed:  Infection, pain, need for additional repair, poor cosmetic result and retained foreign body  Universal protocol:     Procedure explained and questions answered to patient or proxy's satisfaction: yes      Relevant documents present and verified: yes    Anesthesia:     Anesthesia method:  Local infiltration    Local anesthetic:  Lidocaine 2% WITH epi  Laceration details:     Location:  Finger    Finger location:  R thumb    Length (cm):  1.5    Depth (mm):  0.3  Pre-procedure details:     Preparation:  Patient was prepped and draped in usual sterile fashion  Exploration:     Limited defect created (wound extended): no      Hemostasis achieved with:  Epinephrine    Imaging outcome: foreign body not noted      Contaminated: no    Treatment:     Area cleansed with:  Chlorhexidine    Amount of cleaning:  Standard    Irrigation solution:  Sterile saline    Irrigation method:  Pressure wash    Visualized foreign bodies/material removed: no      Debridement:  None    Undermining:  None    Scar revision: no      Layers/structures repaired:  Deep  subcutaneous and deep dermal/superficial fascia  Deep subcutaneous:     Suture size:  6-0    Suture material:  Vicryl    Suture technique:  Simple interrupted    Number of sutures:  8  Post-procedure details:     Procedure completion:  Tolerated well, no immediate complications      Point of Care Test & Imaging Results from this visit  No results found for this visit on 25.   Imaging  No results found.    Cardiology, Vascular, and Other Imaging  No other imaging results found for the past 2 days      Diagnostic study results (if any) were reviewed by GUILLE Lopez.    Assessment/Plan   Allergies, medications, history, and pertinent labs/EKGs/Imaging reviewed by GUILLE Lopez.     Return to clinic in 10-14 days for suture removal          Orders and Diagnoses  Diagnoses and all orders for this visit:  Laceration of right thumb without foreign body without damage to nail, initial encounter  -     Tdap vaccine, age 7 years and older  (BOOSTRIX)  -     cephalexin (Keflex) 500 mg capsule; Take 1 capsule (500 mg) by mouth 2 times a day for 7 days.    Rtc 10-14 days to have sutures removed  Return to Urgent care if symptoms return or progress  Follow up with PCP in 1-2 weeks       Patient disposition: Home    Electronically signed by GUILLE Lopez  1:04 PM             [1] (Not in a hospital admission)  [2]   Past Medical History:  Diagnosis Date    Breast cyst     Hypertension     Other specified diseases of liver 2020    Liver cyst    Personal history of other diseases of the digestive system 2015    History of constipation    Personal history of other diseases of the digestive system 10/07/2015    History of constipation    Personal history of other diseases of urinary system 2017    History of hematuria    Sickle cell trait    [3]   Past Surgical History:  Procedure Laterality Date     SECTION, CLASSIC  2015     Section     CHOLECYSTECTOMY  2015    Cholecystectomy Laparoscopic    INDUCED       WISDOM TOOTH EXTRACTION

## 2025-08-22 ENCOUNTER — OFFICE VISIT (OUTPATIENT)
Dept: URGENT CARE | Age: 45
End: 2025-08-22
Payer: COMMERCIAL

## 2025-08-22 VITALS
HEIGHT: 65 IN | OXYGEN SATURATION: 99 % | BODY MASS INDEX: 34.99 KG/M2 | RESPIRATION RATE: 18 BRPM | SYSTOLIC BLOOD PRESSURE: 129 MMHG | WEIGHT: 210 LBS | HEART RATE: 52 BPM | TEMPERATURE: 98.9 F | DIASTOLIC BLOOD PRESSURE: 81 MMHG

## 2025-08-22 DIAGNOSIS — Z48.02 VISIT FOR SUTURE REMOVAL: Primary | ICD-10-CM

## 2025-08-22 DIAGNOSIS — S61.011D LACERATION OF RIGHT THUMB WITHOUT FOREIGN BODY, NAIL DAMAGE STATUS UNSPECIFIED, SUBSEQUENT ENCOUNTER: ICD-10-CM

## 2025-08-22 RX ORDER — MUPIROCIN 20 MG/G
OINTMENT TOPICAL
Qty: 22 G | Refills: 0 | Status: SHIPPED | OUTPATIENT
Start: 2025-08-22 | End: 2025-09-01

## 2025-08-22 ASSESSMENT — ENCOUNTER SYMPTOMS: WOUND: 1

## 2025-09-10 ENCOUNTER — APPOINTMENT (OUTPATIENT)
Dept: OBSTETRICS AND GYNECOLOGY | Facility: CLINIC | Age: 45
End: 2025-09-10
Payer: COMMERCIAL

## 2025-09-19 ENCOUNTER — APPOINTMENT (OUTPATIENT)
Dept: OBSTETRICS AND GYNECOLOGY | Facility: CLINIC | Age: 45
End: 2025-09-19
Payer: COMMERCIAL

## 2025-10-14 ENCOUNTER — APPOINTMENT (OUTPATIENT)
Dept: PRIMARY CARE | Facility: CLINIC | Age: 45
End: 2025-10-14
Payer: COMMERCIAL